# Patient Record
Sex: MALE | Race: ASIAN
[De-identification: names, ages, dates, MRNs, and addresses within clinical notes are randomized per-mention and may not be internally consistent; named-entity substitution may affect disease eponyms.]

---

## 2018-10-13 ENCOUNTER — HOSPITAL ENCOUNTER (INPATIENT)
Dept: HOSPITAL 74 - YASAS | Age: 63
Discharge: ANOTHER HOSPITAL | End: 2018-10-13
Attending: INTERNAL MEDICINE | Admitting: INTERNAL MEDICINE
Payer: COMMERCIAL

## 2018-10-13 ENCOUNTER — HOSPITAL ENCOUNTER (INPATIENT)
Dept: HOSPITAL 74 - JER | Age: 63
LOS: 6 days | Discharge: HOME | End: 2018-10-19
Attending: INTERNAL MEDICINE | Admitting: INTERNAL MEDICINE
Payer: COMMERCIAL

## 2018-10-13 VITALS — TEMPERATURE: 97.5 F | DIASTOLIC BLOOD PRESSURE: 118 MMHG | HEART RATE: 126 BPM | SYSTOLIC BLOOD PRESSURE: 156 MMHG

## 2018-10-13 VITALS — BODY MASS INDEX: 26.5 KG/M2

## 2018-10-13 VITALS — BODY MASS INDEX: 25.8 KG/M2

## 2018-10-13 DIAGNOSIS — Z96.653: ICD-10-CM

## 2018-10-13 DIAGNOSIS — R74.0: ICD-10-CM

## 2018-10-13 DIAGNOSIS — M10.9: ICD-10-CM

## 2018-10-13 DIAGNOSIS — R73.9: ICD-10-CM

## 2018-10-13 DIAGNOSIS — F10.230: Primary | ICD-10-CM

## 2018-10-13 DIAGNOSIS — D64.9: ICD-10-CM

## 2018-10-13 DIAGNOSIS — E83.42: ICD-10-CM

## 2018-10-13 DIAGNOSIS — I10: ICD-10-CM

## 2018-10-13 DIAGNOSIS — L81.4: ICD-10-CM

## 2018-10-13 DIAGNOSIS — M06.9: ICD-10-CM

## 2018-10-13 DIAGNOSIS — Z91.14: ICD-10-CM

## 2018-10-13 DIAGNOSIS — L97.829: ICD-10-CM

## 2018-10-13 DIAGNOSIS — F13.10: ICD-10-CM

## 2018-10-13 DIAGNOSIS — R60.0: ICD-10-CM

## 2018-10-13 LAB
ALBUMIN SERPL-MCNC: 2.7 G/DL (ref 3.4–5)
ALP SERPL-CCNC: 138 U/L (ref 45–117)
ALT SERPL-CCNC: 45 U/L (ref 13–61)
AMYLASE SERPL-CCNC: 67 U/L (ref 25–115)
ANION GAP SERPL CALC-SCNC: 9 MMOL/L (ref 8–16)
APPEARANCE UR: CLEAR
AST SERPL-CCNC: 70 U/L (ref 15–37)
BASOPHILS # BLD: 1.6 % (ref 0–2)
BILIRUB SERPL-MCNC: 0.6 MG/DL (ref 0.2–1)
BILIRUB UR STRIP.AUTO-MCNC: NEGATIVE MG/DL
BUN SERPL-MCNC: 12 MG/DL (ref 7–18)
CALCIUM SERPL-MCNC: 7.5 MG/DL (ref 8.5–10.1)
CHLORIDE SERPL-SCNC: 104 MMOL/L (ref 98–107)
CO2 SERPL-SCNC: 26 MMOL/L (ref 21–32)
COLOR UR: (no result)
CREAT SERPL-MCNC: 1 MG/DL (ref 0.55–1.3)
DEPRECATED RDW RBC AUTO: 17.7 % (ref 11.9–15.9)
EOSINOPHIL # BLD: 2.1 % (ref 0–4.5)
EPITH CASTS URNS QL MICRO: (no result) /HPF
GLUCOSE SERPL-MCNC: 96 MG/DL (ref 74–106)
HCT VFR BLD CALC: 37.2 % (ref 35.4–49)
HGB BLD-MCNC: 12.3 GM/DL (ref 11.7–16.9)
INR BLD: 1.02 (ref 0.83–1.09)
KETONES UR QL STRIP: NEGATIVE
LEUKOCYTE ESTERASE UR QL STRIP.AUTO: NEGATIVE
LIPASE SERPL-CCNC: 192 U/L (ref 73–393)
LYMPHOCYTES # BLD: 11.8 % (ref 8–40)
MAGNESIUM SERPL-MCNC: 1.6 MG/DL (ref 1.8–2.4)
MCH RBC QN AUTO: 30.1 PG (ref 25.7–33.7)
MCHC RBC AUTO-ENTMCNC: 33.1 G/DL (ref 32–35.9)
MCV RBC: 91 FL (ref 80–96)
MONOCYTES # BLD AUTO: 6.1 % (ref 3.8–10.2)
MUCOUS THREADS URNS QL MICRO: (no result)
NEUTROPHILS # BLD: 78.4 % (ref 42.8–82.8)
NITRITE UR QL STRIP: NEGATIVE
PH UR: 7 [PH] (ref 5–8)
PLATELET # BLD AUTO: 438 K/MM3 (ref 134–434)
PMV BLD: 7.7 FL (ref 7.5–11.1)
POTASSIUM SERPLBLD-SCNC: 3.5 MMOL/L (ref 3.5–5.1)
PROT SERPL-MCNC: 6.7 G/DL (ref 6.4–8.2)
PROT UR QL STRIP: NEGATIVE
PROT UR QL STRIP: NEGATIVE
PT PNL PPP: 12 SEC (ref 9.7–13)
RBC # BLD AUTO: 4.09 M/MM3 (ref 4–5.6)
SODIUM SERPL-SCNC: 139 MMOL/L (ref 136–145)
SP GR UR: 1 (ref 1.01–1.03)
UROBILINOGEN UR STRIP-MCNC: NEGATIVE MG/DL (ref 0.2–1)
WBC # BLD AUTO: 7.3 K/MM3 (ref 4–10)

## 2018-10-13 PROCEDURE — G0008 ADMIN INFLUENZA VIRUS VAC: HCPCS

## 2018-10-13 PROCEDURE — HZ2ZZZZ DETOXIFICATION SERVICES FOR SUBSTANCE ABUSE TREATMENT: ICD-10-PCS | Performed by: INTERNAL MEDICINE

## 2018-10-13 NOTE — PN
Teaching Attending Note


Name of Resident: Naida Finnegan





ATTENDING PHYSICIAN STATEMENT





I saw and evaluated the patient.


I reviewed the resident's note and discussed the case with the resident.


I agree with the resident's findings and plan as documented with exceptions 

below.








SUBJECTIVE:


63 yom with PMhx of ETOH abuse, HTN (non compliant with meds), drinks heavily 

about 1.5 bottles of vodka/day, 





OBJECTIVE:








ASSESSMENT AND PLAN:

## 2018-10-13 NOTE — PDOC
History of Present Illness





- General


Chief Complaint: Tachycardia


Stated Complaint: TACHYCARDIA


Time Seen by Provider: 10/13/18 13:02


History Source: Patient


Exam Limitations: No Limitations





- History of Present Illness


Initial Comments: 





10/13/18 13:17


This is a 63 YOM with h/o heavy EtOH use (drinks a fifth to a fifth-and-a-half 

of liquor/day, prior withdrawal and detox 2 years ago c/b withdrawal seizures) 

and HTN who p/w shakes, head-to-toe body aches, generalized weakness, malaise, 

RHR, and high blood pressure in the setting of stopping drinking EtOH 

yesterday. He notes the symptoms are worsening and are similar to his prior 

withdrawal symptoms. He denies any difficulty balancing or hallucinations 

today. He notes the generalized body aches are worse in both calves than 

anywhere else (but the calves are equally painful both sides). He denies SI/HI, 

chest pain, headache, vertigo, vision change, abdominal pain, dysuria, or 

additional symptoms. He states he has not gotten any type of medication for 

withdrawal or HTN or other problems.





Past History





- Past Medical History


Allergies/Adverse Reactions: 


 Allergies











Allergy/AdvReac Type Severity Reaction Status Date / Time


 


No Known Allergies Allergy   Verified 10/13/18 10:00











Home Medications: 


Ambulatory Orders





NK [No Known Home Medication]  10/13/18 








Asthma: No


Cardiac Disorders: No


COPD: No


Diabetes: No


GI Disorders: No


 Disorders: No


HTN: Yes


Kidney Stones: No


Seizures: No





- Surgical History


Abdominal Surgery: No


Appendectomy: No


Cardiac Surgery: No


Cholecystectomy: No


Lung Surgery: No


Neurologic Surgery: No


Orthopedic Surgery: Yes (B/L REPLACEMENT 2017)





- Reproductive History


Testicular Surgery: No





- Suicide/Smoking/Psychosocial Hx


Smoking History: Never smoked


Have you smoked in the past 12 months: No


Hx Substance Use Treatment: Yes ([facility] 2011)





**Review of Systems





- Review of Systems


Able to Perform ROS?: Yes


Constitutional: Yes: Malaise, Weakness.  No: Chills, Fever, Unexplained wgt Loss


HEENTM: No: Nose Congestion, Throat Pain


Respiratory: No: Cough, Shortness of Breath


Cardiac (ROS): Yes: Edema, Palpitations.  No: Chest Pain, Lightheadedness, 

Syncope


ABD/GI: No: Constipated, Diarrhea, Nausea, Vomiting


: No: Burning, Dysuria


Musculoskeletal: No: Back Pain, Neck Pain


Integumentary: No: Bruising, Rash


Neurological: Yes: Tremors.  No: Headache, Numbness, Tingling, Weakness, 

Dizziness


Endocrine: No: Unexplained Weight Gain, Unexplained Weight Loss





*Physical Exam





- Vital Signs


 Last Vital Signs











Temp Pulse Resp BP Pulse Ox


 


 98.9 F   134 H  18   159/119 H  98 


 


 10/13/18 12:56  10/13/18 12:56  10/13/18 12:56  10/13/18 12:56  10/13/18 12:56





Rectal temp 99.2





10/13/18 13:25


GENERAL: appears a bit uncomfortable, tremulous, nourished, A/Ox4, speaking in 

full sentences, answers questions appropriately, slightly slurred speech


HEENT: PERRLA, EOMI, dry mucous membranes, no posterior pharyngeal erythema, no 

tonsillar swelling or exudates, no cervical lymphadenopathy


NECK: No midline ttp, no spinal stepoff or deformity, full ROM, supple


CARDIOVASCULAR: Rapid regular rate, normal S1S2, no MGR, radial and DP pulses 2

+ and symmetric, capillary refill <2 seconds, extremities warm and well-perfused

, trace pitting edema BLE symmetrically


LUNGS/RESPIRATORY: No respiratory distress, normal and symmetric chest 

movements during respirations, lungs CTA bilaterally, equal breath sounds, no 

cyanosis, no nail clubbing


GI/ABDOMEN: Normal symmetric appearance, normoactive bowel sounds, soft, no 

tenderness to palpation, no midline pulsatile masses, no palpated organomegaly


BACK: No midline ttp or stepoff or deformity of thoracic or lumbar spine


EXTREMITIES: distal pulses 2+, warm and well-perfused, trace BLE edema 

symmetrically with symmetric mild calf TTP


SKIN: Warm and dry, lower legs overly dry and scaly with a few avulsed scales 

and exposed dermis, no pallor, no jaundice, no bruising


NEUROLOGICAL: GCS 15, CN II-XII grossly intact, gait not tested, moving all 

extremities, 5/5 strength proximally and distally, no facial droop, no 

decreased sensation, +tremulous








**Heart Score/ECG Review


  ** #1





10/13/18 13:19


Sinus rhythm, rate of 132, normal axis and intervals, no ischemic ST-T changes





  ** #2





10/13/18 14:46


Sinus rhythm, rate of 127, normal axis, normal QRS, QRS is prolonged grossly 

and calculated by computer at 546, no TONIA





ED Treatment Course





- LABORATORY


CBC & Chemistry Diagram: 


 10/13/18 13:10





 10/13/18 16:34





Medical Decision Making





- Medical Decision Making





10/13/18 13:12


Pt with h/o heavy alcohol use p/w 





 Initial Vital Signs











Temp Pulse Resp BP Pulse Ox


 


 98.9 F   134 H  18   159/119 H  98 


 


 10/13/18 12:56  10/13/18 12:56  10/13/18 12:56  10/13/18 12:56  10/13/18 12:56











Exam: As noted in Physical Exam section.


DDX IBNLT: intoxication, withdrawal (w/wo seizures), DT, hepatic encephalopathy

, alcoholic cardiomyopathy, ICH, UGIB (can cause AMS), LGIB, SBP, metabolic 

derangement, coingestion, hepatorenal syndrome, hepatopulmonary syndrome, 

Wernickes encephalopathy, Korsakoff syndrome, trauma, etc.


W/U ordered: EKG CXR BLE Duplex Labs as noted below


TX ordered: Banana Bag IVF Librium 50 mg Ativan 1 mg IVPUSH





10/13/18 13:49


Placed US guided PIV LAC, 20g.





EKG: Reviewed; results as noted in ECG Review section.


CXR: No acute changes; large heart, unfolded aorta.








 Vital Signs











Temperature  98.9 F   10/13/18 12:56


 


Pulse Rate  124 H  10/13/18 14:41


 


Respiratory Rate  18   10/13/18 14:41


 


Blood Pressure  107/83   10/13/18 14:32


 


O2 Sat by Pulse Oximetry (%)  96   10/13/18 14:41








10/13/18 14:43


Patient now persistently in the 180s HR.


Repeating EKG now.





10/13/18 14:47


Rhythm strip printed from telemetry tracing at this time.


At 14:47 irregular sinus rhythm recorded with rate averaging 150.


At 14:48 patient's rhythm turns regular, rate about 180.


Difficult to tell but this does still appear sinus rhythm, p buried in QRS.


We are diligently watching for e/o SVT which we do not see at this time.





 Laboratory Tests











  10/13/18 10/13/18 10/13/18





  13:10 13:10 13:17


 


WBC  7.3  


 


RBC  4.09  


 


Hgb  12.3  


 


Hct  37.2  


 


MCV  91.0  


 


MCH  30.1  


 


MCHC  33.1  


 


RDW  17.7 H  


 


Plt Count  438 H  


 


MPV  7.7  


 


Absolute Neuts (auto)  5.7  


 


Neutrophils %  78.4  


 


Lymphocytes %  11.8  


 


Monocytes %  6.1  


 


Eosinophils %  2.1  


 


Basophils %  1.6  


 


Nucleated RBC %  0  


 


PT with INR   12.00 


 


INR   1.02 


 


Sodium    Cancelled


 


Potassium    Cancelled


 


Chloride    Cancelled


 


Carbon Dioxide    Cancelled


 


Anion Gap    Cancelled


 


BUN    Cancelled


 


Creatinine    Cancelled


 


Creat Clearance w eGFR    Cancelled


 


Random Glucose    Cancelled


 


Calcium    Cancelled


 


Phosphorus    Cancelled


 


Magnesium    Cancelled


 


Total Bilirubin    Cancelled


 


AST    Cancelled


 


ALT    Cancelled


 


Alkaline Phosphatase    Cancelled


 


Creatine Kinase    Cancelled


 


Troponin I    Cancelled


 


B-Natriuretic Peptide    Cancelled


 


Total Protein    Cancelled


 


Albumin    Cancelled


 


Total Amylase   


 


Lipase    Cancelled


 


Urine Color   


 


Urine Appearance   


 


Urine pH   


 


Ur Specific Gravity   


 


Urine Protein   


 


Urine Glucose (UA)   


 


Urine Ketones   


 


Urine Blood   


 


Urine Nitrite   


 


Urine Bilirubin   


 


Urine Urobilinogen   


 


Ur Leukocyte Esterase   


 


Urine WBC (Auto)   


 


Urine RBC (Auto)   


 


Ur Epithelial Cells   


 


Urine Mucus   








10/13/18 14:27


I have ordered 2 mg IV Push as patient's HR remains high, he is still tremulous

, +tongue fasiculations.











  10/13/18 10/13/18 10/13/18





  15:30 15:30 15:30


 


WBC   


 


RBC   


 


Hgb   


 


Hct   


 


MCV   


 


MCH   


 


MCHC   


 


RDW   


 


Plt Count   


 


MPV   


 


Absolute Neuts (auto)   


 


Neutrophils %   


 


Lymphocytes %   


 


Monocytes %   


 


Eosinophils %   


 


Basophils %   


 


Nucleated RBC %   


 


PT with INR   


 


INR   


 


Sodium   Cancelled 


 


Potassium   Cancelled 


 


Chloride   Cancelled 


 


Carbon Dioxide   Cancelled 


 


Anion Gap   Cancelled 


 


BUN   Cancelled 


 


Creatinine   Cancelled 


 


Creat Clearance w eGFR   Cancelled 


 


Random Glucose   Cancelled 


 


Calcium   Cancelled 


 


Phosphorus   Cancelled 


 


Magnesium   Cancelled 


 


Total Bilirubin   Cancelled 


 


AST   Cancelled 


 


ALT   Cancelled 


 


Alkaline Phosphatase   Cancelled 


 


Creatine Kinase    Cancelled


 


Troponin I    Cancelled


 


B-Natriuretic Peptide    Cancelled


 


Total Protein   Cancelled 


 


Albumin   Cancelled 


 


Total Amylase   


 


Lipase    Cancelled


 


Urine Color  Straw  


 


Urine Appearance  Clear  


 


Urine pH  7.0  


 


Ur Specific Gravity  1.005 L  


 


Urine Protein  Negative  


 


Urine Glucose (UA)  Negative  


 


Urine Ketones  Negative  


 


Urine Blood  1+ H  


 


Urine Nitrite  Negative  


 


Urine Bilirubin  Negative  


 


Urine Urobilinogen  Negative  


 


Ur Leukocyte Esterase  Negative  


 


Urine WBC (Auto)  None  


 


Urine RBC (Auto)  1  


 


Ur Epithelial Cells  Rare  


 


Urine Mucus  Rare  














  10/13/18 10/13/18





  16:34 16:34


 


WBC  


 


RBC  


 


Hgb  


 


Hct  


 


MCV  


 


MCH  


 


MCHC  


 


RDW  


 


Plt Count  


 


MPV  


 


Absolute Neuts (auto)  


 


Neutrophils %  


 


Lymphocytes %  


 


Monocytes %  


 


Eosinophils %  


 


Basophils %  


 


Nucleated RBC %  


 


PT with INR  


 


INR  


 


Sodium  139 


 


Potassium  3.5 


 


Chloride  104 


 


Carbon Dioxide  26 


 


Anion Gap  9 


 


BUN  12 


 


Creatinine  1.0 


 


Creat Clearance w eGFR  > 60 


 


Random Glucose  96 


 


Calcium  7.5 L 


 


Phosphorus  


 


Magnesium  1.6 L 


 


Total Bilirubin  0.6 


 


AST  70 H 


 


ALT  45 


 


Alkaline Phosphatase  138 H 


 


Creatine Kinase   97


 


Troponin I   < 0.02


 


B-Natriuretic Peptide  


 


Total Protein  6.7 


 


Albumin  2.7 L 


 


Total Amylase  67 


 


Lipase  192 


 


Urine Color  


 


Urine Appearance  


 


Urine pH  


 


Ur Specific Gravity  


 


Urine Protein  


 


Urine Glucose (UA)  


 


Urine Ketones  


 


Urine Blood  


 


Urine Nitrite  


 


Urine Bilirubin  


 


Urine Urobilinogen  


 


Ur Leukocyte Esterase  


 


Urine WBC (Auto)  


 


Urine RBC (Auto)  


 


Ur Epithelial Cells  


 


Urine Mucus  








On preliminary read no e/o DVT.





Reassessment: Patient appears comfortable, , BP in the 120s systolic.


He has no complaints and is sleeping, easily arousable, still tremulous after a 

total of 3 mg Ativan.





10/13/18 18:04


The Pt is unsafe for discharge at this time.


They require further hospital observation, workup, and treatment.


Microblog sent to [facility] for admission.


Spoke with Dr. Lloyd, in agreement Pt to be admitted to [facility].


Decision to Admit order placed to Dr. Lloyd.


Placed order for another 1 mg Ativan IV push.





 Vital Signs











Temperature  98.9 F   10/13/18 12:56


 


Pulse Rate  92 H  10/13/18 22:35


 


Respiratory Rate  18   10/13/18 22:35


 


Blood Pressure  135/101 H  10/13/18 22:35


 


O2 Sat by Pulse Oximetry (%)  100   10/13/18 22:35











*DC/Admit/Observation/Transfer


Diagnosis at time of Disposition: 


Alcohol withdrawal


Qualifiers:


 Complication of substance-induced condition: uncomplicated Qualified Code(s): 

F10.230 - Alcohol dependence with withdrawal, uncomplicated








- Discharge Dispostion


Condition at time of disposition: Guarded


Decision to Admit order: Yes





- Referrals





- Patient Instructions





- Post Discharge Activity

## 2018-10-13 NOTE — HP
CHIEF COMPLAINT:shaking, palpitations





PCP:





HISTORY OF PRESENT ILLNESS:


Patient is a 63 year old male with past medical history of EtOH abuse (

recurrent admissions at [facility] for withdrawal and detox, hx of withdrawal 

seizures), HTN and gout, was brought in from [facility] due to shaking, 

generalized body aches and weakness, and heart racing for a few days. Patient's 

last drink was last night where he consumed 1 and a half bottles of vodka and 

passed out. As per patient, he was brought to [facility], where a head CT was 

done, reportedly to be negative, and he was given Librium. He was then 

discharged to [facility] for further rehab and detox today. At [facility], 

patient reported to have worsening of shaking, body aches, weakness and 

palpitations, and was brought here. Patient denies nausea, vomiting, headaches, 

chest pain, SOB, abdominal pain, diarrhea, constipation, or urinary symptoms. 





ER course was notable for:


(1)Ativan 4mg given, Librium 50mg PO


(2)EKG - sinus tachycardia


(3)Banana bag, Mg sulfate 1gm IV 





Recent Travel: denies any recent travel





PAST MEDICAL HISTORY:


Ethanol abuse


HTN


Gout





PAST SURGICAL HISTORY:


B/l knee replacement 


?Spinal surgery





Social History:


Smoking:denies smoking


Alcohol:drinks 1 to 1 1/2 bottles of vodka almost everyday


Drugs: denies


lives with sister, works as a 





Family History:


Allergies





No Known Allergies Allergy (Verified 10/13/18 10:00)


 








HOME MEDICATIONS:


 Home Medications











 Medication  Instructions  Recorded


 


NK [No Known Home Medication]  10/13/18








REVIEW OF SYSTEMS


CONSTITUTIONAL: shaking


Absent:  fever, chills, diaphoresis, generalized weakness, malaise, loss of 

appetite, weight change


HEENT: 


Absent:  rhinorrhea, nasal congestion, throat pain, throat swelling, difficulty 

swallowing, mouth swelling, ear pain, eye pain, visual changes


CARDIOVASCULAR: palpitations


Absent: chest pain, syncope,irregular heart rate, lightheadedness, peripheral 

edema


RESPIRATORY: 


Absent: cough, shortness of breath, dyspnea with exertion, orthopnea, wheezing, 

stridor, hemoptysis


GASTROINTESTINAL:


Absent: abdominal pain, abdominal distension, nausea, vomiting, diarrhea, 

constipation, melena, hematochezia


GENITOURINARY: 


Absent: dysuria, frequency, urgency, hesitancy, hematuria, flank pain, genital 

pain


MUSCULOSKELETAL: 


Absent: myalgia, arthralgia, joint swelling, back pain, neck pain


SKIN: 


Absent: rash, itching, pallor


HEMATOLOGIC/IMMUNOLOGIC: 


Absent: easy bleeding, easy bruising, lymphadenopathy, frequent infections


ENDOCRINE:


Absent: unexplained weight gain, unexplained weight loss, heat intolerance, 

cold intolerance


NEUROLOGIC: 


Absent: headache, focal weakness or paresthesias, dizziness, unsteady gait, 

seizure, mental status changes, bladder or bowel incontinence


PSYCHIATRIC: 


Absent: anxiety, depression, suicidal or homicidal ideation, hallucinations.








PHYSICAL EXAMINATION


 Vital Signs - 24 hr











  10/13/18 10/13/18 10/13/18





  12:56 14:32 14:41


 


Temperature 98.9 F  


 


Pulse Rate 134 H  


 


Pulse Rate [   124 H





Apical]   


 


Respiratory 18  18





Rate   


 


Blood Pressure 159/119 H  


 


Blood Pressure  107/83 





[Left Arm]   


 


O2 Sat by Pulse 98  96





Oximetry (%)   














  10/13/18





  19:20


 


Temperature 


 


Pulse Rate 


 


Pulse Rate [ 114 H





Apical] 


 


Respiratory 18





Rate 


 


Blood Pressure 


 


Blood Pressure 





[Left Arm] 


 


O2 Sat by Pulse 96





Oximetry (%) 











GENERAL: Awake, alert, and fully oriented, in no acute distress.


HEAD: patchy hypopigmented areas


EYES: PERRLA, EOMI, sclera anicteric, conjunctiva clear. No lid lag.


EARS, NOSE, THROAT: Ears normal, nares patent, oropharynx clear without 

exudates. Dry mucous membranes.


NECK: Normal range of motion, supple without lymphadenopathy, JVD, or masses.


LUNGS: Breath sounds equal, clear to auscultation bilaterally. 


HEART: Tachycardic, normal S1 and S2 without murmur, rub or gallop.


ABDOMEN: Soft, nontender, not distended, normoactive bowel sounds.


MUSCULOSKELETAL: Normal range of motion at all joints. No bony deformities or 

tenderness. No CVA tenderness.


UPPER EXTREMITIES: 2+ pulses, warm, well-perfused. No cyanosis. No clubbing. No 

peripheral edema.


LOWER EXTREMITIES: 2+ pulses, warm, well-perfused. No calf tenderness. +1 b/l 

pitting edema.  +dry, flaky skin on both legs, with wounds and scabs 


NEUROLOGICAL:  Cranial nerves II-XII intact. Normal speech. Gait not observed. 

Sensation intact, motor 5/5


PSYCHIATRIC: Cooperative. Good eye contact. Appropriate mood and affect.


SKIN: Warm, dry, normal turgor, no rashes or lesions noted, normal capillary 

refill. 


 Laboratory Results - last 24 hr











  10/13/18 10/13/18 10/13/18





  13:10 13:10 13:17


 


WBC  7.3  


 


RBC  4.09  


 


Hgb  12.3  


 


Hct  37.2  


 


MCV  91.0  


 


MCH  30.1  


 


MCHC  33.1  


 


RDW  17.7 H  


 


Plt Count  438 H  


 


MPV  7.7  


 


Absolute Neuts (auto)  5.7  


 


Neutrophils %  78.4  


 


Lymphocytes %  11.8  


 


Monocytes %  6.1  


 


Eosinophils %  2.1  


 


Basophils %  1.6  


 


Nucleated RBC %  0  


 


PT with INR   12.00 


 


INR   1.02 


 


Sodium    Cancelled


 


Potassium    Cancelled


 


Chloride    Cancelled


 


Carbon Dioxide    Cancelled


 


Anion Gap    Cancelled


 


BUN    Cancelled


 


Creatinine    Cancelled


 


Creat Clearance w eGFR    Cancelled


 


Random Glucose    Cancelled


 


Calcium    Cancelled


 


Phosphorus    Cancelled


 


Magnesium    Cancelled


 


Total Bilirubin    Cancelled


 


AST    Cancelled


 


ALT    Cancelled


 


Alkaline Phosphatase    Cancelled


 


Creatine Kinase    Cancelled


 


Troponin I    Cancelled


 


B-Natriuretic Peptide    Cancelled


 


Total Protein    Cancelled


 


Albumin    Cancelled


 


Total Amylase   


 


Lipase    Cancelled


 


Urine Color   


 


Urine Appearance   


 


Urine pH   


 


Ur Specific Gravity   


 


Urine Protein   


 


Urine Glucose (UA)   


 


Urine Ketones   


 


Urine Blood   


 


Urine Nitrite   


 


Urine Bilirubin   


 


Urine Urobilinogen   


 


Ur Leukocyte Esterase   


 


Urine WBC (Auto)   


 


Urine RBC (Auto)   


 


Ur Epithelial Cells   


 


Urine Mucus   














  10/13/18 10/13/18 10/13/18





  15:30 15:30 15:30


 


WBC   


 


RBC   


 


Hgb   


 


Hct   


 


MCV   


 


MCH   


 


MCHC   


 


RDW   


 


Plt Count   


 


MPV   


 


Absolute Neuts (auto)   


 


Neutrophils %   


 


Lymphocytes %   


 


Monocytes %   


 


Eosinophils %   


 


Basophils %   


 


Nucleated RBC %   


 


PT with INR   


 


INR   


 


Sodium   Cancelled 


 


Potassium   Cancelled 


 


Chloride   Cancelled 


 


Carbon Dioxide   Cancelled 


 


Anion Gap   Cancelled 


 


BUN   Cancelled 


 


Creatinine   Cancelled 


 


Creat Clearance w eGFR   Cancelled 


 


Random Glucose   Cancelled 


 


Calcium   Cancelled 


 


Phosphorus   Cancelled 


 


Magnesium   Cancelled 


 


Total Bilirubin   Cancelled 


 


AST   Cancelled 


 


ALT   Cancelled 


 


Alkaline Phosphatase   Cancelled 


 


Creatine Kinase    Cancelled


 


Troponin I    Cancelled


 


B-Natriuretic Peptide    Cancelled


 


Total Protein   Cancelled 


 


Albumin   Cancelled 


 


Total Amylase   


 


Lipase    Cancelled


 


Urine Color  Straw  


 


Urine Appearance  Clear  


 


Urine pH  7.0  


 


Ur Specific Gravity  1.005 L  


 


Urine Protein  Negative  


 


Urine Glucose (UA)  Negative  


 


Urine Ketones  Negative  


 


Urine Blood  1+ H  


 


Urine Nitrite  Negative  


 


Urine Bilirubin  Negative  


 


Urine Urobilinogen  Negative  


 


Ur Leukocyte Esterase  Negative  


 


Urine WBC (Auto)  None  


 


Urine RBC (Auto)  1  


 


Ur Epithelial Cells  Rare  


 


Urine Mucus  Rare  














  10/13/18 10/13/18





  16:34 16:34


 


WBC  


 


RBC  


 


Hgb  


 


Hct  


 


MCV  


 


MCH  


 


MCHC  


 


RDW  


 


Plt Count  


 


MPV  


 


Absolute Neuts (auto)  


 


Neutrophils %  


 


Lymphocytes %  


 


Monocytes %  


 


Eosinophils %  


 


Basophils %  


 


Nucleated RBC %  


 


PT with INR  


 


INR  


 


Sodium  139 


 


Potassium  3.5 


 


Chloride  104 


 


Carbon Dioxide  26 


 


Anion Gap  9 


 


BUN  12 


 


Creatinine  1.0 


 


Creat Clearance w eGFR  > 60 


 


Random Glucose  96 


 


Calcium  7.5 L 


 


Phosphorus  


 


Magnesium  1.6 L 


 


Total Bilirubin  0.6 


 


AST  70 H 


 


ALT  45 


 


Alkaline Phosphatase  138 H 


 


Creatine Kinase   97


 


Troponin I   < 0.02


 


B-Natriuretic Peptide  


 


Total Protein  6.7 


 


Albumin  2.7 L 


 


Total Amylase  67 


 


Lipase  192 


 


Urine Color  


 


Urine Appearance  


 


Urine pH  


 


Ur Specific Gravity  


 


Urine Protein  


 


Urine Glucose (UA)  


 


Urine Ketones  


 


Urine Blood  


 


Urine Nitrite  


 


Urine Bilirubin  


 


Urine Urobilinogen  


 


Ur Leukocyte Esterase  


 


Urine WBC (Auto)  


 


Urine RBC (Auto)  


 


Ur Epithelial Cells  


 


Urine Mucus  











ASSESSMENT/PLAN:


Patient is a 63 year old male with past medical history of EtOH abuse (

recurrent admissions at [facility] for withdrawal and detox, hx of withdrawal 

seizures), HTN and gout, was brought in from [facility] due to shaking, 

generalized body aches and weakness, and heart racing for a few days. 





#Alcohol withdrawal


-Previous history of withdrawal seizures


-Close monitoring and seizure precautions


-Ativan 2mg IV q2h PRN


-Replete Mg, Folic acid


-Thiamine 1000mg IVPB q7eralq





#Hypomagnesemia


-Mg 1.6


-replete as necessary


-Magnesium 1gm x3 given overnight


-MgOx 800mg PO daily


-will monitor





#Hypertension


-Pt not compliant, don't know what medications his taking


-will reconcile medications in the AM


-monitor BP





#FEN


-IV D5NS + 20meqKCl @100ml/hr


-hypoMg, will replete


-Routine bmp monitoring


-Sodium controlled diet with aspiration precautions





#Prophylaxis


-Heparin 5000units sq tid





#Disposition


-admit to telemetry


-Discharge back to [facility] when medically optimized.





Visit type





- Emergency Visit


Emergency Visit: Yes


ED Registration Date: 10/13/18


Care time: The patient presented to the Emergency Department on the above date 

and was hospitalized for further evaluation of their emergent condition.





- New Patient


This patient is new to me today: Yes


Date on this admission: 10/14/18





- Critical Care


Critical Care patient: No

## 2018-10-13 NOTE — PN
Teaching Attending Note


Name of Resident: Naida Finnegan





ATTENDING PHYSICIAN STATEMENT





I saw and evaluated the patient.


I reviewed the resident's note and discussed the case with the resident.


I agree with the resident's findings and plan as documented with exceptions 

below.








SUBJECTIVE:


63 yom with PMHx of ETOH abuse, drinks 1.5 Bottles/day, HTN non compliant with 

meds, prior admissions for ETOH withdrawal (reportedly at [facility] for a few 

weeks for alcohol withdrawal a month ago, when was on 'drips), possible ETOH 

withdrawal seizures, yesterday had 1.5 Bottles of vodka, after which lost 

conscious and fell, was taken to [facility], after reportedly neg w/u 

including CT head, sent to [facility], was noted tachycardic/hypertensive, sent 

to ED.


patient reported palpitations, tremors, was noted HR 170s in the ED (?SVT) and 

hypertensive that improved with IV ativan.


Currently comfortably, falling back to sleep, reports some palpitations, but 

denies any chest pain, dyspnea, dizziness, nausea, vomiting, abdominal pain, 

headache, chest pressure or new concerns.


12 point ROS done, limited but given above. 





OBJECTIVE:


 Vital Signs











 Period  Temp  Pulse  Resp  BP Sys/Cheung  Pulse Ox


 


 Last 24 Hr  98.9 F  124-134  18-18  107-159/  96-98








 Intake & Output











 10/10/18 10/11/18 10/12/18 10/13/18





 23:59 23:59 23:59 23:59


 


Weight    163 lb











GENERAL: Sleeping but arousable, appropriate in conversation when woken up


HEAD: multiple prior old scars and hyperpigmentation


EYES: Pupils equal, round and reactive to light, extraocular movements intact, 

sclera anicteric, conjunctiva clear. No lid lag.


EARS, NOSE, THROAT: poor dentition, dry mukcous membrane


NECK: soft, supple, no JVD


LUNGS: Breath sounds equal, clear to auscultation bilaterally. No wheezes, and 

no crackles. No accessory muscle use.


HEART: S1s2 regular tachycardic.


ABDOMEN: Soft, nontender, not distended, normoactive bowel sounds, no guarding, 

no rebound, no masses.  


MUSCULOSKELETAL: Normal range of motion at all joints. No bony deformities or 

tenderness. No CVA tenderness.


UPPER EXTREMITIES: 2+ pulses, warm, well-perfused. No cyanosis. No clubbing. No 

peripheral edema.


LOWER EXTREMITIES: bilateral lower extremity skin pigmentation, flaking, 

multiple superficial ulceration and scabs, positive DP pulses


NEUROLOGICAL:  Arousable, but falls back to sleep, moves all extremities freely

, mild tremors, no focal deficit but limited exam as patient keeps falling back 

to sleep


PSYCHIATRIC: co-operative but keeps falling back to sleep


SKIN: Warm, dry, normal turgor, no rashes or lesions noted, normal capillary 

refill. 





 Home Medications











 Medication  Instructions  Recorded


 


NK [No Known Home Medication]  10/13/18














Active Medications





Heparin Sodium (Porcine) (Heparin -)  5,000 unit SQ ONCE ONE


   Stop: 10/13/18 19:01


Folic Acid 1 mg/ Thiamine HCl 100 mg/ Multivitamins/Minerals 10 ml/ Sodium 

Chloride  1,000 mls @ 125 mls/hr IVPB ONCE ONE


   Stop: 10/13/18 21:49


   Last Admin: 10/13/18 15:01 Dose:  125 mls/hr


Lorazepam (Ativan Injection -)  1 mg IVPUSH Q2H PRN


   PRN Reason: WITHDRAWAL(CONT SUBST)





 Laboratory Results - last 24 hr











  10/13/18 10/13/18 10/13/18





  13:10 13:10 13:17


 


WBC  7.3  


 


RBC  4.09  


 


Hgb  12.3  


 


Hct  37.2  


 


MCV  91.0  


 


MCH  30.1  


 


MCHC  33.1  


 


RDW  17.7 H  


 


Plt Count  438 H  


 


MPV  7.7  


 


Absolute Neuts (auto)  5.7  


 


Neutrophils %  78.4  


 


Lymphocytes %  11.8  


 


Monocytes %  6.1  


 


Eosinophils %  2.1  


 


Basophils %  1.6  


 


Nucleated RBC %  0  


 


PT with INR   12.00 


 


INR   1.02 


 


Sodium    Cancelled


 


Potassium    Cancelled


 


Chloride    Cancelled


 


Carbon Dioxide    Cancelled


 


Anion Gap    Cancelled


 


BUN    Cancelled


 


Creatinine    Cancelled


 


Creat Clearance w eGFR    Cancelled


 


Random Glucose    Cancelled


 


Calcium    Cancelled


 


Phosphorus    Cancelled


 


Magnesium    Cancelled


 


Total Bilirubin    Cancelled


 


AST    Cancelled


 


ALT    Cancelled


 


Alkaline Phosphatase    Cancelled


 


Creatine Kinase    Cancelled


 


Troponin I    Cancelled


 


B-Natriuretic Peptide    Cancelled


 


Total Protein    Cancelled


 


Albumin    Cancelled


 


Total Amylase   


 


Lipase    Cancelled


 


Urine Color   


 


Urine Appearance   


 


Urine pH   


 


Ur Specific Gravity   


 


Urine Protein   


 


Urine Glucose (UA)   


 


Urine Ketones   


 


Urine Blood   


 


Urine Nitrite   


 


Urine Bilirubin   


 


Urine Urobilinogen   


 


Ur Leukocyte Esterase   


 


Urine WBC (Auto)   


 


Urine RBC (Auto)   


 


Ur Epithelial Cells   


 


Urine Mucus   














  10/13/18 10/13/18 10/13/18





  15:30 15:30 15:30


 


WBC   


 


RBC   


 


Hgb   


 


Hct   


 


MCV   


 


MCH   


 


MCHC   


 


RDW   


 


Plt Count   


 


MPV   


 


Absolute Neuts (auto)   


 


Neutrophils %   


 


Lymphocytes %   


 


Monocytes %   


 


Eosinophils %   


 


Basophils %   


 


Nucleated RBC %   


 


PT with INR   


 


INR   


 


Sodium   Cancelled 


 


Potassium   Cancelled 


 


Chloride   Cancelled 


 


Carbon Dioxide   Cancelled 


 


Anion Gap   Cancelled 


 


BUN   Cancelled 


 


Creatinine   Cancelled 


 


Creat Clearance w eGFR   Cancelled 


 


Random Glucose   Cancelled 


 


Calcium   Cancelled 


 


Phosphorus   Cancelled 


 


Magnesium   Cancelled 


 


Total Bilirubin   Cancelled 


 


AST   Cancelled 


 


ALT   Cancelled 


 


Alkaline Phosphatase   Cancelled 


 


Creatine Kinase    Cancelled


 


Troponin I    Cancelled


 


B-Natriuretic Peptide    Cancelled


 


Total Protein   Cancelled 


 


Albumin   Cancelled 


 


Total Amylase   


 


Lipase    Cancelled


 


Urine Color  Straw  


 


Urine Appearance  Clear  


 


Urine pH  7.0  


 


Ur Specific Gravity  1.005 L  


 


Urine Protein  Negative  


 


Urine Glucose (UA)  Negative  


 


Urine Ketones  Negative  


 


Urine Blood  1+ H  


 


Urine Nitrite  Negative  


 


Urine Bilirubin  Negative  


 


Urine Urobilinogen  Negative  


 


Ur Leukocyte Esterase  Negative  


 


Urine WBC (Auto)  None  


 


Urine RBC (Auto)  1  


 


Ur Epithelial Cells  Rare  


 


Urine Mucus  Rare  














  10/13/18 10/13/18





  16:34 16:34


 


WBC  


 


RBC  


 


Hgb  


 


Hct  


 


MCV  


 


MCH  


 


MCHC  


 


RDW  


 


Plt Count  


 


MPV  


 


Absolute Neuts (auto)  


 


Neutrophils %  


 


Lymphocytes %  


 


Monocytes %  


 


Eosinophils %  


 


Basophils %  


 


Nucleated RBC %  


 


PT with INR  


 


INR  


 


Sodium  139 


 


Potassium  3.5 


 


Chloride  104 


 


Carbon Dioxide  26 


 


Anion Gap  9 


 


BUN  12 


 


Creatinine  1.0 


 


Creat Clearance w eGFR  > 60 


 


Random Glucose  96 


 


Calcium  7.5 L 


 


Phosphorus  


 


Magnesium  1.6 L 


 


Total Bilirubin  0.6 


 


AST  70 H 


 


ALT  45 


 


Alkaline Phosphatase  138 H 


 


Creatine Kinase   97


 


Troponin I   < 0.02


 


B-Natriuretic Peptide  


 


Total Protein  6.7 


 


Albumin  2.7 L 


 


Total Amylase  67 


 


Lipase  192 


 


Urine Color  


 


Urine Appearance  


 


Urine pH  


 


Ur Specific Gravity  


 


Urine Protein  


 


Urine Glucose (UA)  


 


Urine Ketones  


 


Urine Blood  


 


Urine Nitrite  


 


Urine Bilirubin  


 


Urine Urobilinogen  


 


Ur Leukocyte Esterase  


 


Urine WBC (Auto)  


 


Urine RBC (Auto)  


 


Ur Epithelial Cells  


 


Urine Mucus  








LE duplex results pending


EKG Sinus tach 120s-130s


Rhythm strip





ASSESSMENT AND PLAN:


63 yomw ith heavy etoh use, HTN, admitted with active alcohol withdrawal, 

transaminitis, hypomagnesemia





-Active alcohol withdrawal


-Transaminitis, suspect alcohol related


-Hypomagnesemia


-Tachycardia, ?SVT (Few P waves on rhythm strip)


-LE chronic edema with ulceration/scabs





Plan:


ativan 1 mg IV q2h prn, seizure precautions.


Serial exams. High risk for DTs and withdrawal seizures given history, hold off.


HR/BP improved with ativan. 


Rate controlling agents based on clinical course.


Trend LFTs.


Replete mg


Folate/thiamine IV.


Alcohol cessation counseling.


Dispo back to [facility] when improved.


Plan discussed with patient in detail, all questions answered.


Admit to telemetry. 


Total admit time 65 min.

## 2018-10-13 NOTE — PDOC
Attending Attestation





- Resident


Resident Name: Jenny Weiss





- ED Attending Attestation


I have performed the following: I have examined & evaluated the patient, The 

case was reviewed & discussed with the resident, I agree w/resident's findings 

& plan, Exceptions are as noted





- HPI


HPI: 





10/13/18 13:26


Mr Redmond is a 62 yo M with a h/o daily and heavy EtOH use (prior withdrawal 

and detox 2 years ago c/b withdrawal seizures) and HTN who p/w tachycardia and 

tremulousness s/p discontinuing ETOH use yesterday


No hallucinations. 


(+) generalized body aches 


(+) bilateral calf pain 


He denies chest pain, headache, vertigo, vision change, abdominal pain, dysuria

, or additional symptoms. 





- Physicial Exam


PE: 





10/13/18 13:29


GENERAL: The patient is in no acute distress.


HEAD: Normal with no signs of trauma.


EYES: PERRLA, EOMI, sclera anicteric, conjunctiva clear.


ENT: Ears normal, nares patent, oropharynx clear without exudates.  Moist 

mucous membranes. Tongue fasciculations


NECK: Normal range of motion, supple 


LUNGS: Breath sounds equal, clear to auscultation bilaterally.  


HEART: Tachycardiac, no murmur 


ABDOMEN: Soft, nontender 


EXTREMITIES: Normal range of motion, no edema.  (+) tremulous 


NEUROLOGICAL: Cranial nerves II through XII grossly intact.  Normal speech.  No 

focal neurological deficits. 


MUSCULOSKELETAL:  no deformities


SKIN: Warm, Dry, normal turgor, no rashes or lesions noted.





- Critical Care Time


Total Critical Care Time: 60


Critical Care Statement: The care of this patient involved high complexity 

decision making to prevent further life threatening deterioration of the patient

's condition and/or to evaluate & treat vital organ system(s) failure or risk 

of failure.





- Medical Decision Making





10/13/18 13:31


Alcohol withdrawal





Will do


labs


RKG


IVF


IV Ativan and Librium 50mg po





10/13/18 16:16








 Laboratory Tests











  10/13/18





  13:10


 


WBC  7.3


 


Hgb  12.3


 


Hct  37.2


 


Plt Count  438 H











 Laboratory Tests











  10/13/18





  15:30


 


Creatine Kinase  113


 


Troponin I  < 0.02


 


B-Natriuretic Peptide  403.8 H


 


Lipase  217











10/13/18 16:25


Chemistries again hemolyzed


 states that the labs are moderately hemolyzed (K is 4.0, AST is 

elevated)


Phlebotomy called to do labs


HR goes as high as 200


Given Banana bag


Give supplemental Mg





10/13/18 16:28





Labs resulted


Pt still tachycardiac and tremulous


More ativan given 


Pt remains on cardiac monitor





Clinical Impression: alcohol withdrawal, initial presentation


10/16/18 20:28








**Discharge Disposition





- Diagnosis


Alcohol withdrawal


Qualifiers:


 Complication of substance-induced condition: uncomplicated Qualified Code(s): 

F10.230 - Alcohol dependence with withdrawal, uncomplicated








- Discharge Dispostion


Condition at time of disposition: Improved


Decision to Admit order: Yes





- Referrals





- Patient Instructions





- Post Discharge Activity

## 2018-10-13 NOTE — HP
CIWA Score





- CIWA Score


Nausea/Vomitin-No Nausea/No Vomiting


Muscle Tremors: 4-Moderate,w/Arms Extend


Anxiety: 0-No Anxiety, at Ease


Agitation: 0-Normal Activity


Paroxysmal Sweats: No Perspiration


Orientation: 0-Oriented


Tacttile Disturbances: 0-None


Auditory Disturbances: 0-None


Visual Disturbances: 0-None


Headache: 0-None Present


CIWA-Ar Total Score: 4





Admission ROS BHS





- HPI


Allergies/Adverse Reactions: 


 Allergies











Allergy/AdvReac Type Severity Reaction Status Date / Time


 


No Known Allergies Allergy   Verified 10/13/18 10:00











History of Present Illness: 





pt here requesting detox from alcohol use , reports 1 .5 bottles vodka/day , 

reports uses x 2  years , denies  seizures, + blackouts , + tremors, + falls 

most recently 1  mo ago w/ scalp laceration went to [facility]  . 

latest use last night , went to [facility] after blackout taken by 

EMS . 


george 0.60 


utox bzo 


pmhx : HTN , has not taken meds > 1 mo ago 


pshx :veronica tkr ( left , r  ) , left  elbow  frx 2/2 fall ORIF  C-spine 

cage and hardware 2 years ago 


psych : denies  


tobacco : denies 


Exam Limitations: No Limitations





- Ebola screening


Have you been sick,other than usual withdrawal symptoms: No





- Review of Systems


Constitutional: No Symptoms Reported


EENT: reports: Other (reading glasses)


Respiratory: reports: No Symptoms reported


Cardiac: reports: No Symptoms Reported


GI: reports: No Symptoms Reported


: reports: No Symptoms Reported


Musculoskeletal: reports: No Symptoms Reported


Integumentary: reports: Dryness, Erythema, Other (swelling in feet 2 weeks ago)


Neuro: reports: Tremors, Unsteady Gait


Endocrine: reports: No Symptoms Reported


Psychiatric: reports: No Sypmtoms Reported, Judgement Intact, Orientated x3


Other Systems: Reviewed and Negative





Patient History





- Patient Medical History


Hx Asthma: No


Hx Chronic Obstructive Pulmonary Disease (COPD): No


Hx Cardiac Disorders: No


Hx Hypertension: Yes


Hx Seizures: No


Hx Diabetes: No


Hx Gastrointestinal Disorders: No


Hx Genitourinary Disorders: No


Hx Sexually Transmitted Disorders: No


Hx Renal Disease (ESRD): No


Hx Depression: No


Hx Suicide Attempt: No


Hx Schizophrenia: No





- Patient Surgical History


Past Surgical History: Yes


Hx Neurologic Surgery: No


Hx Cataract Extraction: No


Hx Cardiac Surgery: No


Hx Lung Surgery: No


Hx Breast Surgery: No


Hx Breast Biopsy: No


Hx Abdominal Surgery: No


Hx Appendectomy: No


Hx Cholecystectomy: No


Hx Genitourinary Surgery: No


Hx  Section: No


Hx Orthopedic Surgery: Yes (B/L REPLACEMENT 2017)


Anesthesia Reaction: No





- PPD History


Previous Implant?: Yes


Documented Results: Negative w/o proof


Implanted On Prior R Admission?: No





- Smoking Cessation


Smoking history: Never smoked


Have you smoked in the past 12 months: No


Hx Chewing Tobacco Use: No





- Substances Abused


  ** Alcohol


Route: Oral


Frequency: Daily


Amount used:  TO 2 VODKA


Age of first use: 25


Date of Last Use: 10/12/18





Admission Physical Exam BHS





- Vital Signs


Vital Signs: 


 Vital Signs - 24 hr











  10/13/18





  09:57


 


Temperature 97.5 F L


 


Pulse Rate 126 H


 


Respiratory 18





Rate 


 


Blood Pressure 156/118 H














- Physical


General Appearance: Yes: Disheveled, Mild Distress, Other (poor personal hygiene

)


HEENTM: Yes: Normocephalic, Normal Voice


Respiratory: Yes: Chest Non-Tender, Lungs Clear, Normal Breath Sounds


Neck: Yes: No masses,lesions,Nodules, Other (surgical scar  right anterolateral

  neck)


Breast: Yes: Breast Exam Deferred


Cardiology: Yes: Tachycardia, Irregular, Other (referred to ER for evaluation 

as  staff unable to obtain EKG 2/2 tremors)


Abdominal: Yes: Normal Bowel Sounds, Non Tender


Genitourinary: Yes: Within Normal Limits


Back: Yes: Normal Inspection


Musculoskeletal: Yes: Joint Stiffness, Joint swelling (right elbow deformity , 

decreased ROM  , cannot fully extend elbow  , veronica hands  deformities, reports 

old frx   while working as a   , bilateral  2nd finger PIP deformity , 

enlarged, radial deviation bilateral  hands  , left VTh dip F contracture , veronica 

knees TKR scars), Other (veronica knees tkr)


Extremities: Yes: Other (veronica le  hyperpigmentation , dry skin , mild pretibial 

edema)


Neurological: Yes: Fully Oriented, Alert, Motor Strength 5/5, Normal Mood/Affect


Integumentary: Yes: Erythema, Other (pre-tibial hyperpigmentation, stasis 

dermatitis , dry skin , pretibial small areas  of ulceration  no d/c  veronica feet  

sking dry , scaly , onychomycosis x 10 toes)





- Diagnostic


(1) Alcohol withdrawal


Current Visit: Yes   Status: Acute   


Qualifiers: 


   Complication of substance-induced condition: with unspecified complication   

Qualified Code(s): F10.239 - Alcohol dependence with withdrawal, unspecified   





BHS Breath Alcohol Content


Breath Alcohol Content: 0.060





Urine Drug Screen





- Results


Drug Screen Negative: No


Urine Drug Screen Results: BZO-Benzodiazepines

## 2018-10-14 LAB
ALBUMIN SERPL-MCNC: 2.9 G/DL (ref 3.4–5)
ALP SERPL-CCNC: 151 U/L (ref 45–117)
ALT SERPL-CCNC: 44 U/L (ref 13–61)
AMPHET UR-MCNC: NEGATIVE NG/ML
ANION GAP SERPL CALC-SCNC: 5 MMOL/L (ref 8–16)
AST SERPL-CCNC: 45 U/L (ref 15–37)
BARBITURATES UR-MCNC: NEGATIVE NG/ML
BASOPHILS # BLD: 0.3 % (ref 0–2)
BENZODIAZ UR SCN-MCNC: POSITIVE NG/ML
BILIRUB SERPL-MCNC: 0.9 MG/DL (ref 0.2–1)
BUN SERPL-MCNC: 9 MG/DL (ref 7–18)
CALCIUM SERPL-MCNC: 8.6 MG/DL (ref 8.5–10.1)
CHLORIDE SERPL-SCNC: 100 MMOL/L (ref 98–107)
CHOLEST SERPL-MCNC: 250 MG/DL (ref 50–200)
CO2 SERPL-SCNC: 29 MMOL/L (ref 21–32)
COCAINE UR-MCNC: NEGATIVE NG/ML
CREAT SERPL-MCNC: 1 MG/DL (ref 0.55–1.3)
DEPRECATED RDW RBC AUTO: 17.8 % (ref 11.9–15.9)
EOSINOPHIL # BLD: 6 % (ref 0–4.5)
GLUCOSE SERPL-MCNC: 116 MG/DL (ref 74–106)
HCT VFR BLD CALC: 37.1 % (ref 35.4–49)
HDLC SERPL-MCNC: 92 MG/DL (ref 40–60)
HGB BLD-MCNC: 12.2 GM/DL (ref 11.7–16.9)
INR BLD: 1.02 (ref 0.83–1.09)
LYMPHOCYTES # BLD: 22 % (ref 8–40)
MAGNESIUM SERPL-MCNC: 1.9 MG/DL (ref 1.8–2.4)
MCH RBC QN AUTO: 30.2 PG (ref 25.7–33.7)
MCHC RBC AUTO-ENTMCNC: 32.8 G/DL (ref 32–35.9)
MCV RBC: 92.2 FL (ref 80–96)
METHADONE UR-MCNC: NEGATIVE NG/ML
MONOCYTES # BLD AUTO: 8.9 % (ref 3.8–10.2)
NEUTROPHILS # BLD: 62.8 % (ref 42.8–82.8)
OPIATES UR QL SCN: NEGATIVE NG/ML
PCP UR QL SCN: NEGATIVE NG/ML
PHOSPHATE SERPL-MCNC: 2.4 MG/DL (ref 2.5–4.9)
PLATELET # BLD AUTO: 417 K/MM3 (ref 134–434)
PMV BLD: 7.5 FL (ref 7.5–11.1)
POTASSIUM SERPLBLD-SCNC: 4.1 MMOL/L (ref 3.5–5.1)
PROT SERPL-MCNC: 7.3 G/DL (ref 6.4–8.2)
PT PNL PPP: 12 SEC (ref 9.7–13)
RBC # BLD AUTO: 4.03 M/MM3 (ref 4–5.6)
SODIUM SERPL-SCNC: 135 MMOL/L (ref 136–145)
TRIGL SERPL-MCNC: 89 MG/DL (ref 0–150)
WBC # BLD AUTO: 5 K/MM3 (ref 4–10)

## 2018-10-14 RX ADMIN — THIAMINE HYDROCHLORIDE SCH MG: 100 INJECTION, SOLUTION INTRAMUSCULAR; INTRAVENOUS at 11:39

## 2018-10-14 RX ADMIN — LORAZEPAM PRN MG: 2 INJECTION INTRAMUSCULAR; INTRAVENOUS at 22:30

## 2018-10-14 RX ADMIN — METOPROLOL TARTRATE SCH: 25 TABLET, FILM COATED ORAL at 22:00

## 2018-10-14 RX ADMIN — LORAZEPAM PRN MG: 2 INJECTION INTRAMUSCULAR; INTRAVENOUS at 16:47

## 2018-10-14 RX ADMIN — MAGNESIUM OXIDE TAB 400 MG (241.3 MG ELEMENTAL MG) SCH MG: 400 (241.3 MG) TAB at 11:39

## 2018-10-14 RX ADMIN — POTASSIUM & SODIUM PHOSPHATES POWDER PACK 280-160-250 MG SCH: 280-160-250 PACK at 22:30

## 2018-10-14 RX ADMIN — HEPARIN SODIUM SCH UNIT: 5000 INJECTION, SOLUTION INTRAVENOUS; SUBCUTANEOUS at 22:30

## 2018-10-14 RX ADMIN — POTASSIUM & SODIUM PHOSPHATES POWDER PACK 280-160-250 MG SCH PACKET: 280-160-250 PACK at 15:25

## 2018-10-14 RX ADMIN — METOPROLOL TARTRATE SCH MG: 25 TABLET, FILM COATED ORAL at 11:39

## 2018-10-14 NOTE — PN
Physical Exam: 


SUBJECTIVE: Patient seen and examined, calm, appropriate, denies any anxiety, 

tremors, nausea, vomiting, abdominal pain or new concerns. 








OBJECTIVE:





 Vital Signs











 Period  Temp  Pulse  Resp  BP Sys/Cheung  Pulse Ox


 


 Last 24 Hr  98.9 F    16-18  107-159/  











GENERAL: sitting in bed in no acute distress, awake and appropriate


CVS:S1S2 regular, tachycardic


Chest; No rales or wheezing, decreased effort


Abdomen:soft, obese, NT, no voluntary or involuntary guarding or rigidity, 

positive bowel sounds


Extremities: no edema, minimal tremors, chronic arthritic changes











 Laboratory Results - last 24 hr











  10/13/18 10/13/18 10/13/18





  13:10 13:10 13:17


 


WBC  7.3  


 


RBC  4.09  


 


Hgb  12.3  


 


Hct  37.2  


 


MCV  91.0  


 


MCH  30.1  


 


MCHC  33.1  


 


RDW  17.7 H  


 


Plt Count  438 H  


 


MPV  7.7  


 


Absolute Neuts (auto)  5.7  


 


Neutrophils %  78.4  


 


Lymphocytes %  11.8  


 


Monocytes %  6.1  


 


Eosinophils %  2.1  


 


Basophils %  1.6  


 


Nucleated RBC %  0  


 


PT with INR   12.00 


 


INR   1.02 


 


Sodium    Cancelled


 


Potassium    Cancelled


 


Chloride    Cancelled


 


Carbon Dioxide    Cancelled


 


Anion Gap    Cancelled


 


BUN    Cancelled


 


Creatinine    Cancelled


 


Creat Clearance w eGFR    Cancelled


 


Random Glucose    Cancelled


 


Calcium    Cancelled


 


Phosphorus    Cancelled


 


Magnesium    Cancelled


 


Total Bilirubin    Cancelled


 


AST    Cancelled


 


ALT    Cancelled


 


Alkaline Phosphatase    Cancelled


 


Creatine Kinase    Cancelled


 


Troponin I    Cancelled


 


B-Natriuretic Peptide    Cancelled


 


Total Protein    Cancelled


 


Albumin    Cancelled


 


Total Amylase   


 


Lipase    Cancelled


 


Urine Color   


 


Urine Appearance   


 


Urine pH   


 


Ur Specific Gravity   


 


Urine Protein   


 


Urine Glucose (UA)   


 


Urine Ketones   


 


Urine Blood   


 


Urine Nitrite   


 


Urine Bilirubin   


 


Urine Urobilinogen   


 


Ur Leukocyte Esterase   


 


Urine WBC (Auto)   


 


Urine RBC (Auto)   


 


Ur Epithelial Cells   


 


Urine Mucus   














  10/13/18 10/13/18 10/13/18





  15:30 15:30 15:30


 


WBC   


 


RBC   


 


Hgb   


 


Hct   


 


MCV   


 


MCH   


 


MCHC   


 


RDW   


 


Plt Count   


 


MPV   


 


Absolute Neuts (auto)   


 


Neutrophils %   


 


Lymphocytes %   


 


Monocytes %   


 


Eosinophils %   


 


Basophils %   


 


Nucleated RBC %   


 


PT with INR   


 


INR   


 


Sodium   Cancelled 


 


Potassium   Cancelled 


 


Chloride   Cancelled 


 


Carbon Dioxide   Cancelled 


 


Anion Gap   Cancelled 


 


BUN   Cancelled 


 


Creatinine   Cancelled 


 


Creat Clearance w eGFR   Cancelled 


 


Random Glucose   Cancelled 


 


Calcium   Cancelled 


 


Phosphorus   Cancelled 


 


Magnesium   Cancelled 


 


Total Bilirubin   Cancelled 


 


AST   Cancelled 


 


ALT   Cancelled 


 


Alkaline Phosphatase   Cancelled 


 


Creatine Kinase    Cancelled


 


Troponin I    Cancelled


 


B-Natriuretic Peptide    Cancelled


 


Total Protein   Cancelled 


 


Albumin   Cancelled 


 


Total Amylase   


 


Lipase    Cancelled


 


Urine Color  Straw  


 


Urine Appearance  Clear  


 


Urine pH  7.0  


 


Ur Specific Gravity  1.005 L  


 


Urine Protein  Negative  


 


Urine Glucose (UA)  Negative  


 


Urine Ketones  Negative  


 


Urine Blood  1+ H  


 


Urine Nitrite  Negative  


 


Urine Bilirubin  Negative  


 


Urine Urobilinogen  Negative  


 


Ur Leukocyte Esterase  Negative  


 


Urine WBC (Auto)  None  


 


Urine RBC (Auto)  1  


 


Ur Epithelial Cells  Rare  


 


Urine Mucus  Rare  














  10/13/18 10/13/18





  16:34 16:34


 


WBC  


 


RBC  


 


Hgb  


 


Hct  


 


MCV  


 


MCH  


 


MCHC  


 


RDW  


 


Plt Count  


 


MPV  


 


Absolute Neuts (auto)  


 


Neutrophils %  


 


Lymphocytes %  


 


Monocytes %  


 


Eosinophils %  


 


Basophils %  


 


Nucleated RBC %  


 


PT with INR  


 


INR  


 


Sodium  139 


 


Potassium  3.5 


 


Chloride  104 


 


Carbon Dioxide  26 


 


Anion Gap  9 


 


BUN  12 


 


Creatinine  1.0 


 


Creat Clearance w eGFR  > 60 


 


Random Glucose  96 


 


Calcium  7.5 L 


 


Phosphorus  


 


Magnesium  1.6 L 


 


Total Bilirubin  0.6 


 


AST  70 H 


 


ALT  45 


 


Alkaline Phosphatase  138 H 


 


Creatine Kinase   97


 


Troponin I   < 0.02


 


B-Natriuretic Peptide  


 


Total Protein  6.7 


 


Albumin  2.7 L 


 


Total Amylase  67 


 


Lipase  192 


 


Urine Color  


 


Urine Appearance  


 


Urine pH  


 


Ur Specific Gravity  


 


Urine Protein  


 


Urine Glucose (UA)  


 


Urine Ketones  


 


Urine Blood  


 


Urine Nitrite  


 


Urine Bilirubin  


 


Urine Urobilinogen  


 


Ur Leukocyte Esterase  


 


Urine WBC (Auto)  


 


Urine RBC (Auto)  


 


Ur Epithelial Cells  


 


Urine Mucus  








Active Medications











Generic Name Dose Route Start Last Admin





  Trade Name Freq  PRN Reason Stop Dose Admin


 


Chlordiazepoxide HCl  50 mg  10/14/18 11:00  





  Librium -  PO  10/15/18 05:01  





  [id number] DARIAN   





     





     





     





     


 


Chlordiazepoxide HCl  25 mg  10/15/18 11:00  





  Librium -  PO  10/16/18 05:01  





  [id number] DARIAN   





     





     





     





     


 


Chlordiazepoxide HCl  15 mg  10/16/18 11:00  





  Librium -  PO  10/17/18 05:01  





  [id number] DARIAN   





     





     





     





     


 


Chlordiazepoxide HCl  25 mg  10/14/18 09:34  





  Librium -  PO  10/17/18 09:33  





  Q4H PRN   





  WITHDRAWAL(CONT SUBST)   





     





     





     


 


Chlordiazepoxide HCl  10 mg  10/17/18 11:00  





  Librium -  PO  10/18/18 05:01  





  [id number] DARIAN   





     





     





     





     


 


Dextrose/Sodium Chloride  20 meq in 1,000 mls @ 100 mls/hr  10/13/18 19:30  10/

13/18 20:20





  Dextrose 5%-Normal Saline+20 Meq Kcl -  IV   100 mls/hr





  ASDIR DARIAN   Administration





     





     





     





     


 


Lorazepam  1 mg  10/14/18 09:35  





  Ativan Injection -  IVPUSH   





  Q6H PRN   





  WITHDRAWAL(CONT SUBST)   





     





     





     


 


Magnesium Oxide  800 mg  10/14/18 10:00  





  Mag-Ox -  PO   





  DAILY DARIAN   





     





     





     





     


 


Thiamine HCl  100 mg  10/14/18 10:00  





  Vitamin B1 Injection -  IVPB  10/16/18 10:01  





  DAILY DARIAN   





     





     





     





     











ASSESSMENT/PLAN:


63 yomw ith heavy etoh use, HTN, admitted with active alcohol withdrawal, 

transaminitis, hypomagnesemia





-Active alcohol withdrawal


-Transaminitis, suspect alcohol related


-Hypomagnesemia


-Tachycardia, ?SVT (Few P waves on rhythm strip)


-LE chronic edema with ulceration/scabs





Plan:


Clinically improved. 


taper ativan.


Start librium detox protocol.


Follow up labs today. 


Seizure/fall precautions. 


HR/BP improved with ativan. 


Drug screen at [facility] pos for benzos, otherwise neg.


Will place on metoprolol 25 mg BID. 


Has underlying HTn, non compliant with meds.  


Trend LFTs.


Replete lytes prn


Change folate/thiamine to PO, gentle hydration for now. 


Alcohol cessation counseling.


Dispo back to [facility] when improved.


Plan discussed with patient and nursing in detail, all questions answered





Visit type





- Emergency Visit


Emergency Visit: Yes


ED Registration Date: 10/13/18


Care time: The patient presented to the Emergency Department on the above date 

and was hospitalized for further evaluation of their emergent condition.





- New Patient


This patient is new to me today: No





- Critical Care


Critical Care patient: No





- Discharge Referral


Referred to [facility] No

## 2018-10-14 NOTE — EKG
Test Reason : 

Blood Pressure : ***/*** mmHG

Vent. Rate : 132 BPM     Atrial Rate : 132 BPM

   P-R Int : 146 ms          QRS Dur : 082 ms

    QT Int : 330 ms       P-R-T Axes : 034 004 056 degrees

   QTc Int : 488 ms

 

*** POOR DATA QUALITY, INTERPRETATION MAY BE ADVERSELY AFFECTED

SINUS TACHYCARDIA

NONSPECIFIC ST AND T WAVE ABNORMALITY

ABNORMAL ECG

NO PREVIOUS ECGS AVAILABLE

Confirmed by MARY DARDEN MD ([id number]) on 10/14/2018 10:49:26 AM

 

Referred By: CLAUDE ROTH           Confirmed By:MARY DARDEN MD

## 2018-10-15 LAB
ALBUMIN SERPL-MCNC: 2.5 G/DL (ref 3.4–5)
ALP SERPL-CCNC: 125 U/L (ref 45–117)
ALT SERPL-CCNC: 32 U/L (ref 13–61)
ANION GAP SERPL CALC-SCNC: 5 MMOL/L (ref 8–16)
AST SERPL-CCNC: 25 U/L (ref 15–37)
BASOPHILS # BLD: 1.4 % (ref 0–2)
BILIRUB SERPL-MCNC: 0.7 MG/DL (ref 0.2–1)
BUN SERPL-MCNC: 11 MG/DL (ref 7–18)
CALCIUM SERPL-MCNC: 8.3 MG/DL (ref 8.5–10.1)
CHLORIDE SERPL-SCNC: 103 MMOL/L (ref 98–107)
CO2 SERPL-SCNC: 28 MMOL/L (ref 21–32)
CREAT SERPL-MCNC: 1.2 MG/DL (ref 0.55–1.3)
DEPRECATED RDW RBC AUTO: 18 % (ref 11.9–15.9)
EOSINOPHIL # BLD: 7.2 % (ref 0–4.5)
GLUCOSE SERPL-MCNC: 222 MG/DL (ref 74–106)
HCT VFR BLD CALC: 31.9 % (ref 35.4–49)
HGB BLD-MCNC: 10.4 GM/DL (ref 11.7–16.9)
INR BLD: 1.04 (ref 0.83–1.09)
LYMPHOCYTES # BLD: 14.9 % (ref 8–40)
MAGNESIUM SERPL-MCNC: 1.8 MG/DL (ref 1.8–2.4)
MCH RBC QN AUTO: 29.5 PG (ref 25.7–33.7)
MCHC RBC AUTO-ENTMCNC: 32.6 G/DL (ref 32–35.9)
MCV RBC: 90.7 FL (ref 80–96)
MONOCYTES # BLD AUTO: 7.5 % (ref 3.8–10.2)
NEUTROPHILS # BLD: 69 % (ref 42.8–82.8)
PHOSPHATE SERPL-MCNC: 2.8 MG/DL (ref 2.5–4.9)
PLATELET # BLD AUTO: 366 K/MM3 (ref 134–434)
PMV BLD: 7.6 FL (ref 7.5–11.1)
POTASSIUM SERPLBLD-SCNC: 4.2 MMOL/L (ref 3.5–5.1)
PROT SERPL-MCNC: 6.3 G/DL (ref 6.4–8.2)
PT PNL PPP: 12.3 SEC (ref 9.7–13)
RBC # BLD AUTO: 3.52 M/MM3 (ref 4–5.6)
SODIUM SERPL-SCNC: 136 MMOL/L (ref 136–145)
WBC # BLD AUTO: 6.9 K/MM3 (ref 4–10)

## 2018-10-15 RX ADMIN — SODIUM CHLORIDE SCH MLS/HR: 9 INJECTION, SOLUTION INTRAVENOUS at 10:08

## 2018-10-15 RX ADMIN — THIAMINE HYDROCHLORIDE SCH MG: 100 INJECTION, SOLUTION INTRAMUSCULAR; INTRAVENOUS at 12:35

## 2018-10-15 RX ADMIN — HEPARIN SODIUM SCH UNIT: 5000 INJECTION, SOLUTION INTRAVENOUS; SUBCUTANEOUS at 15:00

## 2018-10-15 RX ADMIN — ALLOPURINOL SCH MG: 100 TABLET ORAL at 12:35

## 2018-10-15 RX ADMIN — MAGNESIUM OXIDE TAB 400 MG (241.3 MG ELEMENTAL MG) SCH MG: 400 (241.3 MG) TAB at 12:19

## 2018-10-15 RX ADMIN — METOPROLOL TARTRATE SCH MG: 25 TABLET, FILM COATED ORAL at 21:05

## 2018-10-15 RX ADMIN — METOPROLOL TARTRATE SCH MG: 25 TABLET, FILM COATED ORAL at 12:19

## 2018-10-15 RX ADMIN — SILVER SULFADIAZINE SCH APPLIC: 10 CREAM TOPICAL at 23:12

## 2018-10-15 RX ADMIN — PANTOPRAZOLE SODIUM SCH MG: 40 TABLET, DELAYED RELEASE ORAL at 12:35

## 2018-10-15 RX ADMIN — HEPARIN SODIUM SCH UNIT: 5000 INJECTION, SOLUTION INTRAVENOUS; SUBCUTANEOUS at 21:04

## 2018-10-15 NOTE — EKG
Test Reason : 

Blood Pressure : ***/*** mmHG

Vent. Rate : 127 BPM     Atrial Rate : 127 BPM

   P-R Int : 152 ms          QRS Dur : 074 ms

    QT Int : 376 ms       P-R-T Axes : 025 -21 033 degrees

   QTc Int : 546 ms

 

SINUS TACHYCARDIA

BASELINE ARTIFACT

WHEN COMPARED WITH ECG OF 13-OCT-2018 13:19,

LIKELY NO SIGNIFICANT CHANGES

Confirmed by KATY YANEZ MD ([id number]) on 10/15/2018 10:38:36 AM

 

Referred By:             Confirmed By:KATY YANEZ MD

## 2018-10-15 NOTE — EKG
Test Reason : 

Blood Pressure : ***/*** mmHG

Vent. Rate : 124 BPM     Atrial Rate : 124 BPM

   P-R Int : 146 ms          QRS Dur : 074 ms

    QT Int : 344 ms       P-R-T Axes : 012 -01 029 degrees

   QTc Int : 494 ms

 

SINUS TACHYCARDIA WITH PREMATURE SUPRAVENTRICULAR COMPLEXES

OTHERWISE NORMAL ECG

WHEN COMPARED WITH ECG OF 13-OCT-2018 14:46,

PREMATURE SUPRAVENTRICULAR COMPLEXES ARE NOW PRESENT



Confirmed by KATY YANEZ MD ([id number]) on 10/15/2018 2:26:23 PM

 

Referred By: ER ER           Confirmed By:KATY YANEZ MD

## 2018-10-15 NOTE — EKG
Test Reason : 

Blood Pressure : ***/*** mmHG

Vent. Rate : 113 BPM     Atrial Rate : 113 BPM

   P-R Int : 126 ms          QRS Dur : 092 ms

    QT Int : 372 ms       P-R-T Axes : 022 006 066 degrees

   QTc Int : 510 ms

 

SINUS TACHYCARDIA WITH PREMATURE SUPRAVENTRICULAR COMPLEXES

BASELINE ARTIFACT

ABNORMAL ECG

NO PREVIOUS ECGS AVAILABLE

Confirmed by RENATA BARRAZA, KATY ([id number]) on 10/15/2018 10:40:04 AM

 

Referred By:             Confirmed By:KATY YANEZ MD

## 2018-10-15 NOTE — PN
Physical Exam: 


SUBJECTIVE: Patient seen and examined at bedside this morning. No acute events 

overnight. Patient was tachycardic this morning, and still reports some shaking 

and heart racing. Patient also complains of right 2nd finger swelling and 

redness. Otherwise, denies nausea, vomiting, chest pain, SOB, abdominal pain, 

diarrhea, urinary symptoms. 








OBJECTIVE:





 Vital Signs











 Period  Temp  Pulse  Resp  BP Sys/Cheung  Pulse Ox


 


 Last 24 Hr  98.2 F-98.7 F    15-20  103-126/59-90  











GENERAL: Awake, alert, and fully oriented, in no acute distress.


HEAD: patchy hypopigmented areas


EYES: PERRLA, EOMI, sclera anicteric, conjunctiva clear. No lid lag.


EARS, NOSE, THROAT: Ears normal, nares patent, oropharynx clear without 

exudates. Dry mucous membranes.


NECK: Normal range of motion, supple without lymphadenopathy, JVD, or masses.


LUNGS: Breath sounds equal, clear to auscultation bilaterally. 


HEART: Tachycardic, normal S1 and S2 without murmur, rub or gallop.


ABDOMEN: Soft, nontender, not distended, normoactive bowel sounds.


MUSCULOSKELETAL: Normal range of motion at all joints. No bony deformities or 

tenderness. No CVA tenderness.


UPPER EXTREMITIES: 2+ pulses, warm, well-perfused. No cyanosis. No clubbing. No 

peripheral edema.


LOWER EXTREMITIES: 2+ pulses, warm, well-perfused. No calf tenderness. +1 b/l 

pitting edema.  +dry, flaky skin on both legs, with wounds and scabs 


NEUROLOGICAL:  Cranial nerves II-XII intact. Normal speech. Gait not observed. 

Sensation intact, motor 5/5


PSYCHIATRIC: Cooperative. Good eye contact. Appropriate mood and affect.


SKIN: Warm, dry, normal turgor, no rashes or lesions noted, normal capillary 

refill. 





 Laboratory Results - last 24 hr











  10/15/18 10/15/18 10/15/18





  05:30 05:30 05:30


 


WBC  6.9  


 


RBC  3.52 L  


 


Hgb  10.4 L  


 


Hct  31.9 L  


 


MCV  90.7  


 


MCH  29.5  


 


MCHC  32.6  


 


RDW  18.0 H  


 


Plt Count  366  


 


MPV  7.6  


 


Absolute Neuts (auto)  4.8  


 


Neutrophils %  69.0  


 


Lymphocytes %  14.9  D  


 


Monocytes %  7.5  


 


Eosinophils %  7.2 H  


 


Basophils %  1.4  D  


 


Nucleated RBC %  0  


 


PT with INR   12.30 


 


INR   1.04 


 


Sodium    136


 


Potassium    4.2


 


Chloride    103


 


Carbon Dioxide    28


 


Anion Gap    5 L


 


BUN    11


 


Creatinine    1.2


 


Creat Clearance w eGFR    > 60


 


Random Glucose    222 H


 


Hemoglobin A1c %   


 


Calcium    8.3 L


 


Phosphorus    2.8


 


Magnesium    1.8


 


Total Bilirubin    0.7


 


AST    25


 


ALT    32


 


Alkaline Phosphatase    125 H


 


Total Protein    6.3 L


 


Albumin    2.5 L














  10/15/18





  05:30


 


WBC 


 


RBC 


 


Hgb 


 


Hct 


 


MCV 


 


MCH 


 


MCHC 


 


RDW 


 


Plt Count 


 


MPV 


 


Absolute Neuts (auto) 


 


Neutrophils % 


 


Lymphocytes % 


 


Monocytes % 


 


Eosinophils % 


 


Basophils % 


 


Nucleated RBC % 


 


PT with INR 


 


INR 


 


Sodium 


 


Potassium 


 


Chloride 


 


Carbon Dioxide 


 


Anion Gap 


 


BUN 


 


Creatinine 


 


Creat Clearance w eGFR 


 


Random Glucose 


 


Hemoglobin A1c %  6.3


 


Calcium 


 


Phosphorus 


 


Magnesium 


 


Total Bilirubin 


 


AST 


 


ALT 


 


Alkaline Phosphatase 


 


Total Protein 


 


Albumin 








Active Medications











Generic Name Dose Route Start Last Admin





  Trade Name Freq  PRN Reason Stop Dose Admin


 


Allopurinol  100 mg  10/15/18 10:00  10/15/18 12:35





  Zyloprim -  PO   100 mg





  DAILY DARIAN   Administration





     





     





     





     


 


Chlordiazepoxide HCl  25 mg  10/15/18 11:00  10/15/18 12:18





  Librium -  PO  10/16/18 05:01  25 mg





  [id number] DARIAN   Administration





     





     





     





     


 


Chlordiazepoxide HCl  15 mg  10/16/18 11:00  





  Librium -  PO  10/17/18 05:01  





  [id number] DARIAN   





     





     





     





     


 


Chlordiazepoxide HCl  25 mg  10/14/18 09:34  





  Librium -  PO  10/17/18 09:33  





  Q4H PRN   





  WITHDRAWAL(CONT SUBST)   





     





     





     


 


Chlordiazepoxide HCl  10 mg  10/17/18 11:00  





  Librium -  PO  10/18/18 05:01  





  [id number] DARIAN   





     





     





     





     


 


Heparin Sodium (Porcine)  5,000 unit  10/14/18 22:00  10/14/18 22:30





  Heparin -  SQ   5,000 unit





  TID DARIAN   Administration





     





     





     





     


 


Sodium Chloride  1,000 mls @ 75 mls/hr  10/15/18 09:15  10/15/18 10:08





  Normal Saline -  IV   75 mls/hr





  ASDIR DARIAN   Administration





     





     





     





     


 


Lorazepam  1 mg  10/15/18 09:38  





  Ativan Injection -  IVPUSH   





  Q3H PRN   





  WITHDRAWAL(CONT SUBST)   





     





     





     


 


Magnesium Oxide  800 mg  10/14/18 10:00  10/15/18 12:19





  Mag-Ox -  PO   800 mg





  DAILY DARIAN   Administration





     





     





     





     


 


Metoprolol Tartrate  25 mg  10/14/18 10:00  10/15/18 12:19





  Lopressor -  PO   25 mg





  BID DARIAN   Administration





     





     





     





     


 


Pantoprazole Sodium  40 mg  10/15/18 10:00  10/15/18 12:35





  Protonix -  PO   40 mg





  DAILY DARIAN   Administration





     





     





     





     


 


Thiamine HCl  100 mg  10/14/18 10:00  10/15/18 12:35





  Vitamin B1 Injection -  IVPB  10/16/18 10:01  100 mg





  DAILY DARIAN   Administration





     





     





     





     








ASSESSMENT/PLAN:


Patient is a 63 year old male with past medical history of EtOH abuse (

recurrent admissions at [facility] for withdrawal and detox, hx of withdrawal 

seizures), HTN and gout, was brought in from [facility] due to shaking, 

generalized body aches and weakness, and heart racing for a few days. 





#Alcohol withdrawal


-Previous history of withdrawal seizures


-Patient remained tachycardic today.


-Close monitoring and seizure precautions


-On Librium detox protocol


-Ativan 1mg IV q3h PRN


-Replete Mg, Folic acid


-Thiamine 1000mg IVPB [id number] (2/3)


-Detox (Dr. Navarrete) consulted. Recommendations appreciated.





#Hypomagnesemia


-Mg 1.6


-replete as necessary


-Magnesium 1gm x3 given overnight


-MgOx 800mg PO daily


-will monitor





#Hypertension


-Pt not compliant, don't know what medications his taking


-Started Metoprolol 25mg BID


-monitor BP





#Hypergylcemia


-Glu - 222


-A1c 6.3


-D5 discontinued. Start IV NS @75


-will continue to monitor





#Hyperlipidemia





#Gout


-started on Allopurinol 100mg daily





#FEN


-IV NS @75ml/hr


-hypoMg,hypophos, will replete


-Routine bmp monitoring


-Sodium controlled diet with aspiration precautions





#Prophylaxis


-Heparin 5000units sq tid





#Disposition


-admit to telemetry


-Discharge back to [facility] when medically optimized.





Visit type





- Emergency Visit


Emergency Visit: Yes


ED Registration Date: 10/13/18


Care time: The patient presented to the Emergency Department on the above date 

and was hospitalized for further evaluation of their emergent condition.





- New Patient


This patient is new to me today: Yes


Date on this admission: 10/16/18





- Critical Care


Critical Care patient: No

## 2018-10-15 NOTE — CONSULT
Consult Detox [facility]


Reason for Current Admission/Consult: heavy alcohol use





- History


History of Present Illness: 





Pt was first seen in [facility] on 10/13 for alcohol use disorder and for detox- 

but pt noted to have high /118 and  and so was transferred to 

[facility] for further management.





Pt admits to drinking 1 1/2 bottles of Vodka/day.  Pt states he does not take 

other substances- only alcohol. Pt states has been drinking for many years. No h

/o seizures or DT's per pt. Has had admissions to [facility] for detox- but 

relapses because he "gets the shakes". Was seen at [facility] ER after a fall, 

and then presented to [facility] for admission for alcohol detox. 








Urine tox positive for benzo and breath alcohol test pos at [facility].





Confidential Drug Utilization Report


Search Terms: [place], 1955 Search Date: 10/15/2018 11:16:45 AM


The Drug Utilization Report below displays all of the controlled substance 

prescriptions, if any, that your patient has filled in the last twelve months. 

The information displayed on this report is compiled from pharmacy submissions 

to the Department, and accurately reflects the information as submitted by the 

pharmacies.





This report was requested by: William Navarrete | Reference #: [id number]





There are no results for the search terms that you entered.





 





CIWA Score





- CIWA Score


Nausea/Vomitin-No Nausea/No Vomiting


Muscle Tremors: None


Anxiety: 0-No Anxiety, at Ease


Agitation: 0-Normal Activity


Paroxysmal Sweats: No Perspiration


Orientation: 0-Oriented


Tacttile Disturbances: 0-None


Auditory Disturbances: 0-None


Visual Disturbances: 0-None


Headache: 0-None Present


CIWA-Ar Total Score: 0





Assessment Plan





- Diagnosis


(1) Alcohol use disorder


Status: Acute   





- Plan


Plan: 





Pt can go back to [facility] to complete detox when medically cleared. Pt is 

from [place], so would like to go to long term drug rehab center closer to home





- Medication


Detox Regimen/Protocol: Librium

## 2018-10-15 NOTE — CONSULT
- Consultation


REQUESTING PROVIDER: 





CONSULT REQUEST: We have been asked to surgically evaluate this patient for 

bilateral leg ulcers





PCP:Rafael Lloyd MD





HISTORY OF PRESENT ILLNESS:


62yo M h/o ETOH abuse who was admitted for alcohol withdrawal.  Vascular team 

was contacted to evaluate his chronic b/l leg wounds.  Pt states that he has a 

history of leg wounds but does not follow up with anyone for wound care.  Pt 

denies history of vascular surgery.  Denies fever, chills, n/v. 





PMHx:   HTN, Gout, ETOH abuse       





    


 Home Medications











 Medication  Instructions  Recorded


 


Allopurinol [Zyloprim -] 100 mg PO DAILY  tablet 10/15/18


 


Magnesium Oxide [Mag-Ox -] 800 mg PO DAILY  tablet 10/15/18


 


Metoprolol Tartrate [Lopressor -] 25 mg PO BID  tablet 10/15/18


 


Naph,Mb-Db/K pH,Mbdb [PHOS-NaK 2 packet PO BID  pow 10/15/18





PACKET -]  


 


Pantoprazole Sodium [Protonix -] 40 mg PO DAILY  tablet.ec 10/15/18








 Allergies











Allergy/AdvReac Type Severity Reaction Status Date / Time


 


No Known Allergies Allergy   Verified 10/13/18 10:00














PHYSICAL EXAM:


GENERAL: Awake, alert, and fully oriented, in no acute distress.


HEAD: Normal with no signs of trauma.


EYES: PERRL, sclera anicteric, conjunctiva clear.


NECK: Normal ROM, supple without lymphadenopathy, JVD, or masses.


LUNGS: breathing comfortably, No accessory muscle use.


HEART: Regular rate and rhythm. No murmurs


MUSCULOSKELETAL: Normal ROM at all joints. No bony deformities or tenderness. 

No CVA tenderness.


LOWER EXTREMITIES: 2+ pulses, warm, well-perfused. RLE shows 2 x 3 cm 

ulceration on anterior shin, b/l legs show hyperpigmentation, cracking, and 

skin changes


NEUROLOGICAL: Normal speech, gait not observed.


PSYCH: Cooperative. Good eye contact. Appropriate mood and affect.


SKIN: Warm, dry, normal turgor, no rashes or lesions noted.


 Vital Signs











Temperature  98.7 F   10/15/18 05:29


 


Pulse Rate  108 H  10/15/18 12:14


 


Respiratory Rate  17   10/15/18 12:14


 


Blood Pressure  126/90   10/15/18 12:14


 


O2 Sat by Pulse Oximetry (%)  100   10/15/18 05:29








 Lab Results











WBC  6.9 K/mm3 (4.0-10.0)   10/15/18  05:30    


 


RBC  3.52 M/mm3 (4.00-5.60)  L  10/15/18  05:30    


 


Hgb  10.4 GM/dL (11.7-16.9)  L  10/15/18  05:30    


 


Hct  31.9 % (35.4-49)  L  10/15/18  05:30    


 


MCV  90.7 fl (80-96)   10/15/18  05:30    


 


MCHC  32.6 g/dl (32.0-35.9)   10/15/18  05:30    


 


RDW  18.0 % (11.9-15.9)  H  10/15/18  05:30    


 


Plt Count  366 K/MM3 (134-434)   10/15/18  05:30    


 


Sodium  136 mmol/L (136-145)   10/15/18  05:30    


 


Potassium  4.2 mmol/L (3.5-5.1)   10/15/18  05:30    


 


Chloride  103 mmol/L ()   10/15/18  05:30    


 


Carbon Dioxide  28 mmol/L (21-32)   10/15/18  05:30    


 


Anion Gap  5 MMOL/L (8-16)  L  10/15/18  05:30    


 


BUN  11 mg/dL (7-18)   10/15/18  05:30    


 


Creatinine  1.2 mg/dL (0.55-1.3)   10/15/18  05:30    


 


Random Glucose  222 mg/dL ()  H  10/15/18  05:30    


 


Calcium  8.3 mg/dL (8.5-10.1)  L  10/15/18  05:30    


 


INR  1.04  (0.83-1.09)   10/15/18  05:30    














Problem List





- Problems


(1) Leg ulcer


Assessment/Plan: 


Plan


   -recommend silvadene on RLE ulcer and compression dressings


   -follow up with wound care clinic as an outpatient


   -no surgical intervention at this time. 








Code(s): L97.909 - NON-PRS CHRONIC ULC UNSP PRT OF UNSP LOW LEG W UNSP SEVERITY

## 2018-10-15 NOTE — PN
Teaching Attending Note


Name of Resident: Naida Finnegan





ATTENDING PHYSICIAN STATEMENT





I saw and evaluated the patient.


I reviewed the resident's note and discussed the case with the resident.


I agree with the resident's findings and plan as documented with exceptions 

below.








SUBJECTIVE:


patient sen and examined. felt palpitations earlier, now resolved, tolerating 

diet well, denies anxiety, dizziness or new concerns.





OBJECTIVE:


 Vital Signs











 Period  Temp  Pulse  Resp  BP Sys/Cheung  Pulse Ox


 


 Last 24 Hr  98.2 F-98.7 F    15-20  103-150/59-90  








 Intake & Output











 10/12/18 10/13/18 10/14/18 10/15/18





 23:59 23:59 23:59 23:59


 


Weight  163 lb  








general; sitting in bed in no acute distress


CVS: S1S2 regular, tachycardic


Chest: CTAB, no rales or wheezing


Abdomen: soft, obese, NT


extremities: superficial ulcer on lower shin at site of prior scab, unchanged 

chronic findings





Active Medications





Chlordiazepoxide HCl (Librium -)  25 mg PO [id number] [facility]


   Stop: 10/16/18 05:01


Chlordiazepoxide HCl (Librium -)  15 mg PO V1C-BGM DARIAN


   Stop: 10/17/18 05:01


Chlordiazepoxide HCl (Librium -)  25 mg PO Q4H PRN


   PRN Reason: WITHDRAWAL(CONT SUBST)


   Stop: 10/17/18 09:33


Chlordiazepoxide HCl (Librium -)  10 mg PO [id number] DARIAN


   Stop: 10/18/18 05:01


Heparin Sodium (Porcine) (Heparin -)  5,000 unit SQ TID [facility]


   Last Admin: 10/14/18 22:30 Dose:  5,000 unit


Sodium Chloride (Normal Saline -)  1,000 mls @ 75 mls/hr IV ASDIR DARIAN


Lorazepam (Ativan Injection -)  1 mg IVPUSH Q6H PRN


   PRN Reason: WITHDRAWAL(CONT SUBST)


   Last Admin: 10/14/18 22:30 Dose:  1 mg


Magnesium Oxide (Mag-Ox -)  800 mg PO DAILY [facility]


   Last Admin: 10/14/18 11:39 Dose:  800 mg


Metoprolol Tartrate (Lopressor -)  25 mg PO BID [facility]


   Last Admin: 10/14/18 22:00 Dose:  Not Given


Thiamine HCl (Vitamin B1 Injection -)  100 mg IVPB DAILY DARIAN


   Stop: 10/16/18 10:01


   Last Admin: 10/14/18 11:39 Dose:  100 mg





 Laboratory Results - last 24 hr











  10/14/18 10/14/18 10/14/18





  10:25 10:25 10:25


 


WBC  5.0  


 


RBC  4.03  


 


Hgb  12.2  


 


Hct  37.1  


 


MCV  92.2  


 


MCH  30.2  


 


MCHC  32.8  


 


RDW  17.8 H  


 


Plt Count  417  


 


MPV  7.5  


 


Absolute Neuts (auto)  3.1  


 


Neutrophils %  62.8  


 


Lymphocytes %  22.0  D  


 


Monocytes %  8.9  


 


Eosinophils %  6.0 H D  


 


Basophils %  0.3  


 


Nucleated RBC %  0  


 


PT with INR   


 


INR   


 


Sodium   135 L 


 


Potassium   4.1 


 


Chloride   100 


 


Carbon Dioxide   29 


 


Anion Gap   5 L 


 


BUN   9 


 


Creatinine   1.0 


 


Creat Clearance w eGFR   > 60 


 


Random Glucose   116 H 


 


Calcium   8.6 


 


Phosphorus   2.4 L 


 


Magnesium   1.9 


 


Total Bilirubin   0.9 


 


Direct Bilirubin    0.4 H


 


AST   45 H 


 


ALT   44 


 


Alkaline Phosphatase   151 H 


 


Total Protein   7.3 


 


Albumin   2.9 L 


 


Triglycerides   89 


 


Cholesterol   250 H 


 


Total LDL Cholesterol   138 H 


 


HDL Cholesterol   92 H 


 


Opiates Screen   


 


Methadone Screen   


 


Barbiturate Screen   


 


Phencyclidine Screen   


 


Ur Amphetamines Screen   


 


MDMA (Ecstasy) Screen   


 


Benzodiazepines Screen   


 


Cocaine Screen   


 


U Marijuana (THC) Screen   














  10/14/18 10/14/18 10/15/18





  10:25 11:00 05:30


 


WBC    6.9


 


RBC    3.52 L


 


Hgb    10.4 L


 


Hct    31.9 L


 


MCV    90.7


 


MCH    29.5


 


MCHC    32.6


 


RDW    18.0 H


 


Plt Count    366


 


MPV    7.6


 


Absolute Neuts (auto)    4.8


 


Neutrophils %    69.0


 


Lymphocytes %    14.9  D


 


Monocytes %    7.5


 


Eosinophils %    7.2 H


 


Basophils %    1.4  D


 


Nucleated RBC %    0


 


PT with INR  12.00  


 


INR  1.02  


 


Sodium   


 


Potassium   


 


Chloride   


 


Carbon Dioxide   


 


Anion Gap   


 


BUN   


 


Creatinine   


 


Creat Clearance w eGFR   


 


Random Glucose   


 


Calcium   


 


Phosphorus   


 


Magnesium   


 


Total Bilirubin   


 


Direct Bilirubin   


 


AST   


 


ALT   


 


Alkaline Phosphatase   


 


Total Protein   


 


Albumin   


 


Triglycerides   


 


Cholesterol   


 


Total LDL Cholesterol   


 


HDL Cholesterol   


 


Opiates Screen   Negative 


 


Methadone Screen   Negative 


 


Barbiturate Screen   Negative 


 


Phencyclidine Screen   Negative 


 


Ur Amphetamines Screen   Negative 


 


MDMA (Ecstasy) Screen   Negative 


 


Benzodiazepines Screen   Positive A* 


 


Cocaine Screen   Negative 


 


U Marijuana (THC) Screen   Negative 














  10/15/18 10/15/18





  05:30 05:30


 


WBC  


 


RBC  


 


Hgb  


 


Hct  


 


MCV  


 


MCH  


 


MCHC  


 


RDW  


 


Plt Count  


 


MPV  


 


Absolute Neuts (auto)  


 


Neutrophils %  


 


Lymphocytes %  


 


Monocytes %  


 


Eosinophils %  


 


Basophils %  


 


Nucleated RBC %  


 


PT with INR  12.30 


 


INR  1.04 


 


Sodium   136


 


Potassium   4.2


 


Chloride   103


 


Carbon Dioxide   28


 


Anion Gap   5 L


 


BUN   11


 


Creatinine   1.2


 


Creat Clearance w eGFR   > 60


 


Random Glucose   222 H


 


Calcium   8.3 L


 


Phosphorus   2.8


 


Magnesium   1.8


 


Total Bilirubin   0.7


 


Direct Bilirubin  


 


AST   25


 


ALT   32


 


Alkaline Phosphatase   125 H


 


Total Protein   6.3 L


 


Albumin   2.5 L


 


Triglycerides  


 


Cholesterol  


 


Total LDL Cholesterol  


 


HDL Cholesterol  


 


Opiates Screen  


 


Methadone Screen  


 


Barbiturate Screen  


 


Phencyclidine Screen  


 


Ur Amphetamines Screen  


 


MDMA (Ecstasy) Screen  


 


Benzodiazepines Screen  


 


Cocaine Screen  


 


U Marijuana (THC) Screen  








 Microbiology





10/13/18 15:30   Urine - Urine Clean Catch   Urine Culture - Final


                            Contaminated: Please Repeat





Telemetry: SInus tach upto 138





ASSESSMENT AND PLAN:


63 yomw ith heavy etoh use, HTN, admitted with active alcohol withdrawal, 

transaminitis, hypomagnesemia





-Active alcohol withdrawal


-Transaminitis, suspect alcohol related


-Hypomagnesemia


-Hyperglycemia


-Tachycardia, ?SVT (Few P waves on rhythm strip)


-LE chronic edema with ulceration/scabs





Plan:


Clinically improved. 


continue librium detox protocol.


Seizure/fall precautions. 


Ongoing tachycardic. AtivanIV prn. 


Detox consult. 


Continue metoprolol 25 mg BID. 


Has underlying HTn, non compliant with meds.  


Trend LFTs.


Replete lytes prn


Hyperglycemia , d/c D5. A1c 6.3


Continue folate/thiamine. 


Alcohol cessation counseling.


Dispo back to [facility] when improved,. in 24-48 hours


Plan discussed with patient and nursing in detail, all questions answered

## 2018-10-16 LAB
ALBUMIN SERPL-MCNC: 2.3 G/DL (ref 3.4–5)
ALP SERPL-CCNC: 103 U/L (ref 45–117)
ALT SERPL-CCNC: 26 U/L (ref 13–61)
ANION GAP SERPL CALC-SCNC: 6 MMOL/L (ref 8–16)
AST SERPL-CCNC: 21 U/L (ref 15–37)
BASOPHILS # BLD: 1.3 % (ref 0–2)
BILIRUB SERPL-MCNC: 0.5 MG/DL (ref 0.2–1)
BUN SERPL-MCNC: 14 MG/DL (ref 7–18)
CALCIUM SERPL-MCNC: 8.1 MG/DL (ref 8.5–10.1)
CHLORIDE SERPL-SCNC: 105 MMOL/L (ref 98–107)
CO2 SERPL-SCNC: 27 MMOL/L (ref 21–32)
CREAT SERPL-MCNC: 1.1 MG/DL (ref 0.55–1.3)
DEPRECATED RDW RBC AUTO: 17.3 % (ref 11.9–15.9)
EOSINOPHIL # BLD: 7.1 % (ref 0–4.5)
GLUCOSE SERPL-MCNC: 113 MG/DL (ref 74–106)
HCT VFR BLD CALC: 30.2 % (ref 35.4–49)
HGB BLD-MCNC: 9.8 GM/DL (ref 11.7–16.9)
LYMPHOCYTES # BLD: 22.6 % (ref 8–40)
MAGNESIUM SERPL-MCNC: 1.8 MG/DL (ref 1.8–2.4)
MCH RBC QN AUTO: 29.7 PG (ref 25.7–33.7)
MCHC RBC AUTO-ENTMCNC: 32.6 G/DL (ref 32–35.9)
MCV RBC: 91.1 FL (ref 80–96)
MONOCYTES # BLD AUTO: 6.3 % (ref 3.8–10.2)
NEUTROPHILS # BLD: 62.7 % (ref 42.8–82.8)
PHOSPHATE SERPL-MCNC: 3.3 MG/DL (ref 2.5–4.9)
PLATELET # BLD AUTO: 379 K/MM3 (ref 134–434)
PMV BLD: 7.8 FL (ref 7.5–11.1)
POTASSIUM SERPLBLD-SCNC: 4.4 MMOL/L (ref 3.5–5.1)
PROT SERPL-MCNC: 6 G/DL (ref 6.4–8.2)
RBC # BLD AUTO: 3.31 M/MM3 (ref 4–5.6)
SODIUM SERPL-SCNC: 138 MMOL/L (ref 136–145)
WBC # BLD AUTO: 7.8 K/MM3 (ref 4–10)

## 2018-10-16 RX ADMIN — SILVER SULFADIAZINE SCH APPLIC: 10 CREAM TOPICAL at 22:13

## 2018-10-16 RX ADMIN — METOPROLOL TARTRATE SCH MG: 25 TABLET, FILM COATED ORAL at 09:10

## 2018-10-16 RX ADMIN — ALLOPURINOL SCH MG: 100 TABLET ORAL at 09:10

## 2018-10-16 RX ADMIN — SODIUM CHLORIDE SCH MLS/HR: 9 INJECTION, SOLUTION INTRAVENOUS at 09:11

## 2018-10-16 RX ADMIN — AMLODIPINE BESYLATE SCH MG: 2.5 TABLET ORAL at 17:46

## 2018-10-16 RX ADMIN — SILVER SULFADIAZINE SCH APPLIC: 10 CREAM TOPICAL at 09:22

## 2018-10-16 RX ADMIN — THIAMINE HYDROCHLORIDE SCH MG: 100 INJECTION, SOLUTION INTRAMUSCULAR; INTRAVENOUS at 09:10

## 2018-10-16 RX ADMIN — MAGNESIUM OXIDE TAB 400 MG (241.3 MG ELEMENTAL MG) SCH MG: 400 (241.3 MG) TAB at 09:09

## 2018-10-16 RX ADMIN — HEPARIN SODIUM SCH UNIT: 5000 INJECTION, SOLUTION INTRAVENOUS; SUBCUTANEOUS at 22:12

## 2018-10-16 RX ADMIN — PANTOPRAZOLE SODIUM SCH MG: 40 TABLET, DELAYED RELEASE ORAL at 09:10

## 2018-10-16 RX ADMIN — METOPROLOL TARTRATE SCH MG: 25 TABLET, FILM COATED ORAL at 22:12

## 2018-10-16 RX ADMIN — HEPARIN SODIUM SCH UNIT: 5000 INJECTION, SOLUTION INTRAVENOUS; SUBCUTANEOUS at 13:50

## 2018-10-16 NOTE — ECHO
______________________________________________________________________________



Name: EDILBERTO SHAW                               Exam:Adult Echocardiogram

MRN: [id number]         Study Date: 10/16/2018 01:11 PM

Age: 63 yrs

______________________________________________________________________________



Reason For Study: TACHYCARDIA

Height: 66 in        Weight: 164 lb        BSA: 1.8 m2



______________________________________________________________________________



MMode/2D Measurements & Calculations

IVSd: 0.90 cm                                         Ao root diam: 2.6 cm

LVIDd: 4.2 cm                                         LA dimension: 3.3 cm

LVIDs: 2.9 cm

LVPWd: 0.83 cm



_______________________________________________________

EDV(Teich): 79.2 ml

ESV(Teich): 31.9 ml



Doppler Measurements & Calculations

MV E max krishan: 51.8 cm/sec                                 Ao V2 max: 102.6 cm/sec

MV A max krishan: 88.4 cm/sec                                 Ao max P.2 mmHg

MV E/A: 0.59



___________________________________________________________

LV V1 max P.2 mmHg                                    MR max krishan: 149.7 cm/sec

LV V1 max: 74.7 cm/sec                                    MR max P.0 mmHg



___________________________________________________________

Med Peak E' Krishan: 6.0 cm/sec

Med E/e': 8.6

Lat Peak E' Krishan: 6.0 cm/sec

Lat E/e': 8.6





______________________________________________________________________________

Procedure

The study was technically difficult with many images being suboptimal in quality.

Left Ventricle

The left ventricular size, thickness and function are normal. LVEF = 60-65%. E/A reversal consistent 
with but

not diagnostic of poor LV compliance.

Right Ventricle

The right ventricle is normal in size and function.

Atria

Normal left and right atrial size and function.

Mitral Valve

There is mild mitral annular calcification.

Tricuspid Valve

The tricuspid valve is normal in structure and function. There was insufficient TR detected to calcul
ate RV

systolic pressure.

Aortic Valve

There is mild aortic sclerosis.;.

Pulmonic Valve

The pulmonic valve is not well visualized.

Great Vessels

The aortic root is normal size. Normal aortic arch, descending and ascending aorta.

Pericardium/Pleura

There is no pericardial effusion.



______________________________________________________________________________



Interpretation Summary

The study was technically difficult with many images being suboptimal in quality.

The left ventricular size, thickness and function are normal. LVEF = 60-65%.

The right ventricle is normal in size and function.

Normal left and right atrial size and function.

There is mild aortic sclerosis.;

There is mild mitral annular calcification.





MD Piper German 10/16/2018 04:47 PM

## 2018-10-16 NOTE — PN
Teaching Attending Note


Name of Resident: Naida Finnegan





ATTENDING PHYSICIAN STATEMENT





I saw and evaluated the patient.


I reviewed the resident's note and discussed the case with the resident.


I agree with the resident's findings and plan as documented.








SUBJECTIVE:asymptomatic. deneis CP, SOB, fever, chills, N/V/C/D, auditory/

visual hallucinations, agitation, RM


denies ever smoking.


no heart disease in the family or sudden unexplained deaths (mother  of MI 

in 80's. brother in 70's)


had echo in the past unsure why it was done or results. never had cardiac workup








OBJECTIVE:


 Last Vital Signs











Temp Pulse Resp BP Pulse Ox


 


 98.7 F   88   18   133/78   97 


 


 10/16/18 13:00  10/16/18 13:00  10/16/18 13:00  10/16/18 13:00  10/16/18 09:26








General NAD


CV S1 S2 RRR no murmur/rub/gallop


Lungs CTA B/L no wheezing/rales/rhonchi


extremities no tremors





ASSESSMENT AND PLAN:


62yo M with PMH continuous ETOH dependence and HTN presented to the ER in 

active ETOH withdrawals and found to be tachycardic


1. Tachycardia- appears to be sinus on EKG. concern for SVT on presentation. 

nothing seen here. on monitor noted to have some PVC. cardiac enzymes neg x1. 

will check echo


2. Continuous ETOH dependence- CIWA 0. on librium protocol. on day 3 of 4. 

interested in inpatient rehab when completed. will call over to [facility] for 

pt to return to complete detox. has not done inpatient rehab in the past. 

counselled on cessation of ETOH use


3. Normocytic anemia- slowly trending down. never had workup in the past. 

denies BRBPR or melena. possible dilutional component. will repeat. would 

benefit from colonoscopy as outpatinet


4. Hypomagnesemia- resolved


5. Transaminitis- due to ETOH. resolved


6. HTN- controlled. cont home management


7. DVT ppx- hep sq


8/ pending echo result. can d/c back to [facility] to complete detox and start 

inpatient rehab

## 2018-10-16 NOTE — PN
Physical Exam: 


SUBJECTIVE: Patient seen and examined at bedside this morning. No acute events 

overnight. Patient remained hemodynamically stable. HR between . 








OBJECTIVE:





 Vital Signs











 Period  Temp  Pulse  Resp  BP Sys/Cheung  Pulse Ox


 


 Last 24 Hr  97.6 F-98.7 F    18-20  105-143/71-99  








GENERAL: Awake, alert, and fully oriented, in no acute distress.


HEAD: patchy hypopigmented areas


EYES: PERRLA, EOMI, sclera anicteric, conjunctiva clear. No lid lag.


EARS, NOSE, THROAT: Ears normal, nares patent, oropharynx clear without 

exudates. Dry mucous membranes.


NECK: Normal range of motion, supple without lymphadenopathy, JVD, or masses.


LUNGS: Breath sounds equal, clear to auscultation bilaterally. 


HEART: regular rate and rhythm, normal S1 and S2 without murmur, rub or gallop.


ABDOMEN: Soft, nontender, not distended, normoactive bowel sounds.


MUSCULOSKELETAL: Normal range of motion at all joints. No bony deformities or 

tenderness. No CVA tenderness.


UPPER EXTREMITIES: 2+ pulses, warm, well-perfused. No cyanosis. No clubbing. No 

peripheral edema.


LOWER EXTREMITIES: 2+ pulses, warm, well-perfused. No calf tenderness. +1 b/l 

pitting edema.  +dry, flaky skin on both legs, with wounds and scabs 


NEUROLOGICAL:  Cranial nerves II-XII intact. Normal speech. Gait not observed. 

Sensation intact, motor 5/5


PSYCHIATRIC: Cooperative. Good eye contact. Appropriate mood and affect.


SKIN: Warm, dry, normal turgor, no rashes or lesions noted, normal capillary 

refill. 





 Laboratory Results - last 24 hr











  10/16/18 10/16/18





  06:00 06:00


 


WBC  7.8 


 


RBC  3.31 L 


 


Hgb  9.8 L 


 


Hct  30.2 L 


 


MCV  91.1 


 


MCH  29.7 


 


MCHC  32.6 


 


RDW  17.3 H 


 


Plt Count  379 


 


MPV  7.8 


 


Absolute Neuts (auto)  4.9 


 


Neutrophils %  62.7 


 


Lymphocytes %  22.6  D 


 


Monocytes %  6.3 


 


Eosinophils %  7.1 H 


 


Basophils %  1.3 


 


Nucleated RBC %  0 


 


Sodium   138


 


Potassium   4.4


 


Chloride   105


 


Carbon Dioxide   27


 


Anion Gap   6 L


 


BUN   14


 


Creatinine   1.1


 


Creat Clearance w eGFR   > 60


 


Random Glucose   113 H


 


Calcium   8.1 L


 


Phosphorus   3.3


 


Magnesium   1.8


 


Total Bilirubin   0.5


 


AST   21


 


ALT   26


 


Alkaline Phosphatase   103


 


Total Protein   6.0 L


 


Albumin   2.3 L








Active Medications











Generic Name Dose Route Start Last Admin





  Trade Name Freq  PRN Reason Stop Dose Admin


 


Allopurinol  100 mg  10/15/18 10:00  10/16/18 09:10





  Zyloprim -  PO   100 mg





  DAILY DARIAN   Administration





     





     





     





     


 


Chlordiazepoxide HCl  15 mg  10/16/18 11:00  10/16/18 11:44





  Librium -  PO  10/17/18 05:01  15 mg





  [id number] DARIAN   Administration





     





     





     





     


 


Chlordiazepoxide HCl  25 mg  10/14/18 09:34  





  Librium -  PO  10/17/18 09:33  





  Q4H PRN   





  WITHDRAWAL(CONT SUBST)   





     





     





     


 


Chlordiazepoxide HCl  10 mg  10/17/18 11:00  





  Librium -  PO  10/18/18 05:01  





  [id number] DARIAN   





     





     





     





     


 


Heparin Sodium (Porcine)  5,000 unit  10/14/18 22:00  10/16/18 13:50





  Heparin -  SQ   5,000 unit





  TID DARIAN   Administration





     





     





     





     


 


Lorazepam  1 mg  10/15/18 09:38  





  Ativan Injection -  IVPUSH   





  Q3H PRN   





  WITHDRAWAL(CONT SUBST)   





     





     





     


 


Magnesium Oxide  800 mg  10/14/18 10:00  10/16/18 09:09





  Mag-Ox -  PO   800 mg





  DAILY DARIAN   Administration





     





     





     





     


 


Metoprolol Tartrate  25 mg  10/14/18 10:00  10/16/18 09:10





  Lopressor -  PO   25 mg





  BID DARIAN   Administration





     





     





     





     


 


Pantoprazole Sodium  40 mg  10/15/18 10:00  10/16/18 09:10





  Protonix -  PO   40 mg





  DAILY DARIAN   Administration





     





     





     





     


 


Silver Sulfadiazine  1 applic  10/15/18 22:00  10/16/18 09:22





  Silvadene -  TP   1 applic





  BID DARIAN   Administration





     





     





     





     











ASSESSMENT/PLAN:


Patient is a 63 year old male with past medical history of EtOH abuse (

recurrent admissions at [facility] for withdrawal and detox, hx of withdrawal 

seizures), HTN and gout, was brought in from [facility] due to shaking, 

generalized body aches and weakness, and heart racing for a few days. 





#Alcohol withdrawal


-Previous history of withdrawal seizures


-Patient remained more stable overnight with HR 


-Close monitoring and seizure precautions


-On Librium detox protocol


-Ativan 1mg IV q3h PRN


-Replete Mg, Folic acid


-Thiamine 1000mg IVPB [id number] (3/3)


-Detox (Dr. Navarrete) consulted. Recommendations appreciated.





#Hypomagnesemia


-Mg 1.8


-replete as necessary


-MgOx 800mg PO daily


-will monitor





#Hypertension


-Pt not compliant, don't know what medications his taking


-Started Metoprolol 25mg BID


-monitor BP





#Hypergylcemia


-Glu - 379


-A1c 6.3


-will continue to monitor





#Hyperlipidemia





#Gout


-started on Allopurinol 100mg daily





#FEN


-Not on any standing fluids


-hypoMg,hypophos, will replete


-Routine bmp monitoring


-Sodium controlled diet with aspiration precautions





#Prophylaxis


-Heparin 5000units sq tid





#Disposition


-admit to telemetry


-Discharge back to [facility] when medically optimized.








Visit type





- Emergency Visit


Emergency Visit: Yes


ED Registration Date: 10/13/18


Care time: The patient presented to the Emergency Department on the above date 

and was hospitalized for further evaluation of their emergent condition.





- New Patient


This patient is new to me today: Yes


Date on this admission: 10/16/18





- Critical Care


Critical Care patient: No

## 2018-10-17 LAB
DEPRECATED RDW RBC AUTO: 17.5 % (ref 11.9–15.9)
HCT VFR BLD CALC: 30.4 % (ref 35.4–49)
HGB BLD-MCNC: 9.6 GM/DL (ref 11.7–16.9)
MCH RBC QN AUTO: 28.9 PG (ref 25.7–33.7)
MCHC RBC AUTO-ENTMCNC: 31.7 G/DL (ref 32–35.9)
MCV RBC: 91.2 FL (ref 80–96)
PLATELET # BLD AUTO: 382 K/MM3 (ref 134–434)
PMV BLD: 7.6 FL (ref 7.5–11.1)
RBC # BLD AUTO: 3.34 M/MM3 (ref 4–5.6)
WBC # BLD AUTO: 8.8 K/MM3 (ref 4–10)

## 2018-10-17 RX ADMIN — SILVER SULFADIAZINE SCH APPLIC: 10 CREAM TOPICAL at 10:11

## 2018-10-17 RX ADMIN — HEPARIN SODIUM SCH UNIT: 5000 INJECTION, SOLUTION INTRAVENOUS; SUBCUTANEOUS at 21:22

## 2018-10-17 RX ADMIN — SILVER SULFADIAZINE SCH APPLIC: 10 CREAM TOPICAL at 21:22

## 2018-10-17 RX ADMIN — HEPARIN SODIUM SCH: 5000 INJECTION, SOLUTION INTRAVENOUS; SUBCUTANEOUS at 19:54

## 2018-10-17 RX ADMIN — HEPARIN SODIUM SCH: 5000 INJECTION, SOLUTION INTRAVENOUS; SUBCUTANEOUS at 19:52

## 2018-10-17 RX ADMIN — TAMSULOSIN HYDROCHLORIDE SCH MG: 0.4 CAPSULE ORAL at 10:02

## 2018-10-17 RX ADMIN — HEPARIN SODIUM SCH UNIT: 5000 INJECTION, SOLUTION INTRAVENOUS; SUBCUTANEOUS at 06:01

## 2018-10-17 RX ADMIN — MAGNESIUM OXIDE TAB 400 MG (241.3 MG ELEMENTAL MG) SCH MG: 400 (241.3 MG) TAB at 10:02

## 2018-10-17 RX ADMIN — NIFEDIPINE SCH MG: 30 TABLET, EXTENDED RELEASE ORAL at 10:02

## 2018-10-17 RX ADMIN — AMLODIPINE BESYLATE SCH MG: 2.5 TABLET ORAL at 10:02

## 2018-10-17 RX ADMIN — FOLIC ACID SCH MG: 1 TABLET ORAL at 10:02

## 2018-10-17 RX ADMIN — METOPROLOL TARTRATE SCH MG: 25 TABLET, FILM COATED ORAL at 21:22

## 2018-10-17 RX ADMIN — METOPROLOL TARTRATE SCH MG: 25 TABLET, FILM COATED ORAL at 10:02

## 2018-10-17 RX ADMIN — ALLOPURINOL SCH MG: 100 TABLET ORAL at 10:02

## 2018-10-17 RX ADMIN — Medication SCH MG: at 10:02

## 2018-10-17 RX ADMIN — PANTOPRAZOLE SODIUM SCH MG: 40 TABLET, DELAYED RELEASE ORAL at 10:02

## 2018-10-17 NOTE — PN
Physical Exam: 


SUBJECTIVE: Patient seen and examined at bedside this morning. No acute events 

overnight. Patient has no new complaints. 








OBJECTIVE:





 Vital Signs











 Period  Temp  Pulse  Resp  BP Sys/Cheung  Pulse Ox


 


 Last 24 Hr  97.9 F-98.8 F    16-20  107-164/  97-97











GENERAL: Awake, alert, and fully oriented, in no acute distress.


HEAD: patchy hypopigmented areas


EYES: PERRLA, EOMI, sclera anicteric, conjunctiva clear. No lid lag.


EARS, NOSE, THROAT: Ears normal, nares patent, oropharynx clear without 

exudates. Dry mucous membranes.


NECK: Normal range of motion, supple without lymphadenopathy, JVD, or masses.


LUNGS: Breath sounds equal, clear to auscultation bilaterally. 


HEART: regular rate and rhythm, normal S1 and S2 without murmur, rub or gallop.


ABDOMEN: Soft, nontender, not distended, normoactive bowel sounds.


MUSCULOSKELETAL: Normal range of motion at all joints. No bony deformities or 

tenderness. No CVA tenderness.


UPPER EXTREMITIES: 2+ pulses, warm, well-perfused. No cyanosis. No clubbing. No 

peripheral edema.


LOWER EXTREMITIES: 2+ pulses, warm, well-perfused. No calf tenderness. +1 b/l 

pitting edema.  +dry, flaky skin on both legs, with wounds and scabs 


NEUROLOGICAL:  Cranial nerves II-XII intact. Normal speech. Gait not observed. 

Sensation intact, motor 5/5


PSYCHIATRIC: Cooperative. Good eye contact. Appropriate mood and affect.


SKIN: Warm, dry, normal turgor, no rashes or lesions noted, normal capillary 

refill.





 Laboratory Results - last 24 hr











  10/17/18





  06:00


 


WBC  8.8


 


RBC  3.34 L


 


Hgb  9.6 L


 


Hct  30.4 L


 


MCV  91.2


 


MCH  28.9


 


MCHC  31.7 L


 


RDW  17.5 H


 


Plt Count  382


 


MPV  7.6








Active Medications











Generic Name Dose Route Start Last Admin





  Trade Name Freq  PRN Reason Stop Dose Admin


 


Allopurinol  100 mg  10/15/18 10:00  10/17/18 10:02





  Zyloprim -  PO   100 mg





  DAILY DARIAN   Administration





     





     





     





     


 


Amlodipine Besylate  2.5 mg  10/16/18 17:45  10/17/18 10:02





  Norvasc -  PO   2.5 mg





  DAILY DARIAN   Administration





     





     





     





     


 


Chlordiazepoxide HCl  10 mg  10/17/18 11:00  10/17/18 11:27





  Librium -  PO  10/18/18 05:01  10 mg





  [id number] DARIAN   Administration





     





     





     





     


 


Folic Acid  1 mg  10/17/18 10:00  10/17/18 10:02





  Folic Acid -  PO   1 mg





  DAILY DARIAN   Administration





     





     





     





     


 


Heparin Sodium (Porcine)  5,000 unit  10/14/18 22:00  10/17/18 06:01





  Heparin -  SQ   5,000 unit





  TID DARIAN   Administration





     





     





     





     


 


Lorazepam  1 mg  10/15/18 09:38  





  Ativan Injection -  IVPUSH   





  Q3H PRN   





  WITHDRAWAL(CONT SUBST)   





     





     





     


 


Magnesium Oxide  800 mg  10/14/18 10:00  10/17/18 10:02





  Mag-Ox -  PO   800 mg





  DAILY DARIAN   Administration





     





     





     





     


 


Metoprolol Tartrate  50 mg  10/17/18 07:51  10/17/18 10:02





  Lopressor -  PO   50 mg





  BID DARIAN   Administration





     





     





     





     


 


Nifedipine  30 mg  10/17/18 10:00  10/17/18 10:02





  Procardia Xl -  PO   30 mg





  DAILY DARIAN   Administration





     





     





     





     


 


Pantoprazole Sodium  40 mg  10/15/18 10:00  10/17/18 10:02





  Protonix -  PO   40 mg





  DAILY DARIAN   Administration





     





     





     





     


 


Silver Sulfadiazine  1 applic  10/15/18 22:00  10/17/18 10:11





  Silvadene -  TP   1 applic





  BID DARIAN   Administration





     





     





     





     


 


Tamsulosin HCl  0.4 mg  10/17/18 10:00  10/17/18 10:02





  Flomax -  PO   0.4 mg





  DAILY DARIAN   Administration





     





     





     





     


 


Thiamine HCl  100 mg  10/17/18 10:00  10/17/18 10:02





  Vitamin B1 -  PO   100 mg





  DAILY DARIAN   Administration





     





     





     





     








ASSESSMENT/PLAN:


Patient is a 63 year old male with past medical history of EtOH abuse (

recurrent admissions at [facility] for withdrawal and detox, hx of withdrawal 

seizures), HTN and gout, was brought in from [facility] due to shaking, 

generalized body aches and weakness, and heart racing for a few days. 





#Alcohol withdrawal


-Previous history of withdrawal seizures


-Patient remained more stable overnight with HR 


-Close monitoring and seizure precautions


-On Librium detox protocol


-Ativan 1mg IV q3h PRN


-Replete Mg, Folic acid


-Thiamine 1000mg IVPB [id number] (3/3)


-Detox (Dr. Navarrete) consulted. Recommendations appreciated.





#Hypomagnesemia


-Mg 1.8


-replete as necessary


-MgOx 800mg PO daily


-will monitor





#Hypertension


-Pt not compliant, don't know what medications his taking


-Increase Metoprolol 50mg BID


-monitor BP





#Hypergylcemia


-Glu - 379


-A1c 6.3


-will continue to monitor





#Hyperlipidemia





#Gout


-started on Allopurinol 100mg daily





#FEN


-Not on any standing fluids


-hypoMg,hypophos, will replete


-Routine bmp monitoring


-Sodium controlled diet with aspiration precautions





#Prophylaxis


-Heparin 5000units sq tid





#Disposition


-admit to telemetry


-Discharge back to [facility] or home tomorrow.





Visit type





- Emergency Visit


Emergency Visit: Yes


ED Registration Date: 10/13/18


Care time: The patient presented to the Emergency Department on the above date 

and was hospitalized for further evaluation of their emergent condition.





- New Patient


This patient is new to me today: Yes


Date on this admission: 10/18/18





- Critical Care


Critical Care patient: No

## 2018-10-17 NOTE — PN
Teaching Attending Note


Name of Resident: Naida Finnegan





ATTENDING PHYSICIAN STATEMENT





I saw and evaluated the patient.


I reviewed the resident's note and discussed the case with the resident.


I agree with the resident's findings and plan as documented.








SUBJECTIVE:asymptomatic. denies Cp, SOB, fever,chills, N/V/C/D, auditory/visual 

hallucinations








OBJECTIVE:





 Last Vital Signs











Temp Pulse Resp BP Pulse Ox


 


 98.8 F   89   20   132/83   97 


 


 10/17/18 10:00  10/17/18 10:00  10/17/18 10:00  10/17/18 10:00  10/17/18 10:00











General NAD


extremities no tremors





ASSESSMENT AND PLAN:


62yo M with PMH continuous ETOH dependence and HTN presented to the ER in 

active ETOH withdrawals and found to be tachycardic


1. Tachycardia- appears to be sinus on EKG. remains sinus on monitor. Echo done 

with no WMA. should have stress test done as outpatient. stressed importance of 

following up with cardio as outpatient. 


2. Continuous ETOH dependence- CIWA 0. on librium protocol. on day 4 of 4. 

interested in inpatient rehab when completed. will see if [facility] has beds 

available.  has not done inpatient rehab in the past. counselled on cessation 

of ETOH use


3. Normocytic anemia-stable. should have screening colonoscopy done as 

outpatient


4. Hypomagnesemia- resolved


5. Transaminitis- due to ETOH. resolved


6. HTN- controlled. cont home management


7. DVT ppx- hep sq


8. Completing detox today. will see if detox bed is available. (none available 

yesterday). if not will pursue inpatient rehab tomorrow.

## 2018-10-18 RX ADMIN — Medication SCH MG: at 10:15

## 2018-10-18 RX ADMIN — MAGNESIUM OXIDE TAB 400 MG (241.3 MG ELEMENTAL MG) SCH MG: 400 (241.3 MG) TAB at 10:15

## 2018-10-18 RX ADMIN — TAMSULOSIN HYDROCHLORIDE SCH MG: 0.4 CAPSULE ORAL at 10:15

## 2018-10-18 RX ADMIN — NIFEDIPINE SCH MG: 30 TABLET, EXTENDED RELEASE ORAL at 10:15

## 2018-10-18 RX ADMIN — AMLODIPINE BESYLATE SCH MG: 2.5 TABLET ORAL at 10:16

## 2018-10-18 RX ADMIN — HEPARIN SODIUM SCH UNIT: 5000 INJECTION, SOLUTION INTRAVENOUS; SUBCUTANEOUS at 05:31

## 2018-10-18 RX ADMIN — PANTOPRAZOLE SODIUM SCH MG: 40 TABLET, DELAYED RELEASE ORAL at 10:16

## 2018-10-18 RX ADMIN — METOPROLOL TARTRATE SCH MG: 25 TABLET, FILM COATED ORAL at 10:15

## 2018-10-18 RX ADMIN — ALLOPURINOL SCH MG: 100 TABLET ORAL at 10:15

## 2018-10-18 RX ADMIN — HEPARIN SODIUM SCH UNIT: 5000 INJECTION, SOLUTION INTRAVENOUS; SUBCUTANEOUS at 13:30

## 2018-10-18 RX ADMIN — HEPARIN SODIUM SCH UNIT: 5000 INJECTION, SOLUTION INTRAVENOUS; SUBCUTANEOUS at 21:20

## 2018-10-18 RX ADMIN — SILVER SULFADIAZINE SCH APPLIC: 10 CREAM TOPICAL at 10:16

## 2018-10-18 RX ADMIN — METOPROLOL TARTRATE SCH MG: 50 TABLET, FILM COATED ORAL at 21:20

## 2018-10-18 RX ADMIN — FOLIC ACID SCH MG: 1 TABLET ORAL at 10:15

## 2018-10-18 RX ADMIN — SILVER SULFADIAZINE SCH APPLIC: 10 CREAM TOPICAL at 21:20

## 2018-10-18 NOTE — PN
Teaching Attending Note


Name of Resident: Naida Finnegan





ATTENDING PHYSICIAN STATEMENT





I saw and evaluated the patient.


I reviewed the resident's note and discussed the case with the resident.


I agree with the resident's findings and plan as documented.








SUBJECTIVE:asymptomatic. denies Cp, SOB, fever, chills, N/V/C/D








OBJECTIVE:





 Last Vital Signs











Temp Pulse Resp BP Pulse Ox


 


 98.0 F   97 H  18   104/69   95 


 


 10/18/18 10:00  10/18/18 10:00  10/18/18 10:00  10/18/18 10:00  10/17/18 21:00











General NAD


extremities no tremors





ASSESSMENT AND PLAN:


64yo M with PMH continuous ETOH dependence and HTN presented to the ER in 

active ETOH withdrawals and found to be tachycardic


1. Tachycardia- appears to be sinus on EKG. remains sinus on monitor. Echo done 

with no WMA. should have stress test done as outpatient. stressed importance of 

following up with cardio as outpatient. 


2. Continuous ETOH dependence- CIWA 0. completed librium protocol.  interested 

in inpatient rehab when completed. [facility] does not have beds at this time. 

will give information for patient to call and start once completed with [facility]. 


3. Normocytic anemia-stable. should have screening colonoscopy done as 

outpatient


4. Hypomagnesemia- resolved


5. Transaminitis- due to ETOH. resolved


6. HTN- controlled. cont home management


7. DVT ppx- hep sq


8. Only ambulated with PT 35Ft. would benefit from [facility] placement. awaiting bed 

availability and ins auth. d/c cardiac monitor

## 2018-10-18 NOTE — DS
Physical Exam: 


SUBJECTIVE: Patient seen and examined at bedside this morning. No acute events 

overnight. Patient has no new complaints. 








OBJECTIVE:





 Vital Signs











 Period  Temp  Pulse  Resp  BP Sys/Cheung  Pulse Ox


 


 Last 24 Hr  97.9 F-98.3 F  79-97  18-20  104-128/63-95  95








PHYSICAL EXAM





ENERAL: Awake, alert, and fully oriented, in no acute distress.


HEAD: patchy hypopigmented areas


EYES: PERRLA, EOMI, sclera anicteric, conjunctiva clear. No lid lag.


EARS, NOSE, THROAT: Ears normal, nares patent, oropharynx clear without 

exudates. Dry mucous membranes.


NECK: Normal range of motion, supple without lymphadenopathy, JVD, or masses.


LUNGS: Breath sounds equal, clear to auscultation bilaterally. 


HEART: regular rate and rhythm, normal S1 and S2 without murmur, rub or gallop.


ABDOMEN: Soft, nontender, not distended, normoactive bowel sounds.


MUSCULOSKELETAL: Normal range of motion at all joints. No bony deformities or 

tenderness. No CVA tenderness.


UPPER EXTREMITIES: 2+ pulses, warm, well-perfused. No cyanosis. No clubbing. No 

peripheral edema.


LOWER EXTREMITIES: 2+ pulses, warm, well-perfused. No calf tenderness. +1 b/l 

pitting edema.  +dry, flaky skin on both legs, with wounds and scabs 


NEUROLOGICAL:  Cranial nerves II-XII intact. Normal speech. Gait not observed. 

Sensation intact, motor 5/5


PSYCHIATRIC: Cooperative. Good eye contact. Appropriate mood and affect.


SKIN: Warm, dry, normal turgor, no rashes or lesions noted, normal capillary 

refill.





LABS


 CBC,CMP











WBC  8.8 K/mm3 (4.0-10.0)   10/17/18  06:00    


 


RBC  3.34 M/mm3 (4.00-5.60)  L  10/17/18  06:00    


 


Hgb  9.6 GM/dL (11.7-16.9)  L  10/17/18  06:00    


 


Hct  30.4 % (35.4-49)  L  10/17/18  06:00    


 


MCV  91.2 fl (80-96)   10/17/18  06:00    


 


MCH  28.9 pg (25.7-33.7)   10/17/18  06:00    


 


MCHC  31.7 g/dl (32.0-35.9)  L  10/17/18  06:00    


 


RDW  17.5 % (11.9-15.9)  H  10/17/18  06:00    


 


Plt Count  382 K/MM3 (134-434)   10/17/18  06:00    


 


MPV  7.6 fl (7.5-11.1)   10/17/18  06:00    


 


Absolute Neuts (auto)  4.9 K/mm3 (1.5-8.0)   10/16/18  06:00    


 


Neutrophils %  62.7 % (42.8-82.8)   10/16/18  06:00    


 


Lymphocytes %  22.6 % (8-40)  D 10/16/18  06:00    


 


Monocytes %  6.3 % (3.8-10.2)   10/16/18  06:00    


 


Eosinophils %  7.1 % (0-4.5)  H  10/16/18  06:00    


 


Basophils %  1.3 % (0-2.0)   10/16/18  06:00    


 


Nucleated RBC %  0 % (0-0)   10/16/18  06:00    


 


Sodium  138 mmol/L (136-145)   10/16/18  06:00    


 


Potassium  4.4 mmol/L (3.5-5.1)   10/16/18  06:00    


 


Chloride  105 mmol/L ()   10/16/18  06:00    


 


Carbon Dioxide  27 mmol/L (21-32)   10/16/18  06:00    


 


Anion Gap  6 MMOL/L (8-16)  L  10/16/18  06:00    


 


BUN  14 mg/dL (7-18)   10/16/18  06:00    


 


Creatinine  1.1 mg/dL (0.55-1.3)   10/16/18  06:00    


 


Creat Clearance w eGFR  > 60  (>60)   10/16/18  06:00    


 


Random Glucose  113 mg/dL ()  H  10/16/18  06:00    


 


Hemoglobin A1c %  6.3 % (4.2-6.3)   10/15/18  05:30    


 


Calcium  8.1 mg/dL (8.5-10.1)  L  10/16/18  06:00    


 


Phosphorus  3.3 mg/dL (2.5-4.9)   10/16/18  06:00    


 


Magnesium  1.8 mg/dL (1.8-2.4)   10/16/18  06:00    


 


Total Bilirubin  0.5 mg/dL (0.2-1)   10/16/18  06:00    


 


Direct Bilirubin  0.4 mg/dL (0.0-0.2)  H  10/14/18  10:25    


 


AST  21 U/L (15-37)   10/16/18  06:00    


 


ALT  26 U/L (13-61)   10/16/18  06:00    


 


Alkaline Phosphatase  103 U/L ()   10/16/18  06:00    


 


Creatine Kinase  97 IU/L ()   10/13/18  16:34    


 


Troponin I  < 0.02 ng/ml (0.00-0.05)   10/13/18  16:34    


 


B-Natriuretic Peptide  Cancelled   10/13/18  15:30    


 


Total Protein  6.0 g/dl (6.4-8.2)  L  10/16/18  06:00    


 


Albumin  2.3 g/dl (3.4-5.0)  L  10/16/18  06:00    


 


Triglycerides  89 mg/dL (0-150)   10/14/18  10:25    


 


Cholesterol  250 mg/dL ()  H  10/14/18  10:25    


 


Total LDL Cholesterol  138 mg/dL (5-100)  H  10/14/18  10:25    


 


HDL Cholesterol  92 mg/dL (40-60)  H  10/14/18  10:25    


 


Total Amylase  67 U/L ()   10/13/18  16:34    


 


Lipase  192 U/L ()   10/13/18  16:34    














HOSPITAL COURSE:





Date of Admission:10/13/18





Date of Discharge: 10/18/18





Patient is a 63 year old male with past medical history of EtOH abuse (

recurrent admissions at [facility] for withdrawal and detox, hx of withdrawal 

seizures), HTN and gout, was brought in from [facility] due to shaking, 

generalized body aches and weakness, and heart racing for a few days. Patient 

was admitted for severe alcohol withdrawal. He was placed on Librium detox 

protocol. He was also given Folic acid and thiamine, and electrolytes were 

repleted as needed. Metoprolol was increased to 50mg BID for hypertension. 

Patient was also started on Allopurinol 100mg daily for gout. Patient was noted 

to have elevated cholesterol and was started on Atorvastatin 20mg daily. 

Patient completed alcohol detox protocol and was discharged home. 








Minutes to complete discharge: 35





Discharge Summary


Reason For Visit: TACHYCARDIA/ALCOHOL WITHDRAWAL SYNDROME/PROLONGED


Current Active Problems





Alcohol use disorder (Acute)


Alcohol withdrawal (Acute)


Leg ulcer (Acute)








Condition: Improved





- Instructions


Diet, Activity, Other Instructions: 


You were admitted because you had severe withdrawals from drinking alcohol. You 

were started on Librium detox protocol.


It is very important that you completely stop drinking alcohol. Drinking 

alcohol can lead to worsening symptoms and health problems including liver 

failure and even death. 





You also have been complaining of swelling and pain of your fingers. This is 

probably caused by gout from drinking too much alcohol.


You were started on a new medication, Allopurinol 100mg once a day.


If you have any skin rash, belly pain, jaundice, flu symptoms, unusual bleeding

, weakness or numbness, stop taking the medication and call your primary care 

doctor immediately. 





You were found to have elevated cholesterol on your blood.


You were started on a new medication, Atorvastatin 20mg daily.


You will continue taking this medication at home which you will take once every 

night because this causes drowsiness.


If you notice any new muscle aches or pain, new belly pain, jaundice or new 

concerns on this medication, please stop the medication and call your doctor. 





Please take the following medications as instructed.


   1. Multivitamins 1 tablet once daily.


   2. Thiamine 100mg once tomorrow.


   3. Protonix 40mg once a day.


   4. Allopurinol 100mg once daily.


   5. Atorvastatin 20mg once daily at bedtime. 





Continue your home medications as prescribed.





Please follow-up with your primary care doctor in 1-2 weeks to have your blood 

work checked.


You will need to follow-up:


-CBC


-BMP


-Magnesium level


-Phosphorus level


-Uric acid level





Do not drive or operate heavy machinery till seen by your doctor.


Avoid being alone with children, on heights or in water.


Avoid tylenol, motrin, Ibuprofen/NSAIDs or any other over-the-counter 

medications without discussion with your doctor.





Call 911 or go to the ED if with severe headaches, loss of consciousness, 

weakness or numbness, seizures, belly pain, dark or bloody stools, or with any 

new concerns noted. 











Disposition: HOME





- Home Medications


Comprehensive Discharge Medication List: 


Ambulatory Orders





Allopurinol [Zyloprim -] 100 mg PO DAILY  tablet 10/15/18 


Pantoprazole Sodium [Protonix -] 40 mg PO DAILY  tablet.ec 10/15/18 


Amlodipine Besylate [Norvasc -] 2.5 mg PO DAILY 10/16/18 


Folic Acid 1 mg PO DAILY 10/16/18 


Methocarbamol [Robaxin -] 750 mg PO BID 10/16/18 


Nifedipine ER [Procardia XL -] 30 mg PO DAILY 10/16/18 


Silver Sulfadiazine 1% Top Cr [Silvadene -] 1 applic TP BID  jar 10/16/18 


Tamsulosin HCl [Flomax] 0.4 mg PO DAILY 10/16/18 


Thiamine HCl [B-1] 100 mg PO DAILY 10/16/18 


Metoprolol Tartrate [Lopressor -] 50 mg PO BID #30 tablet 10/18/18 








This patient is new to me today: Yes


Date on this admission: 10/19/18


Emergency Visit: Yes


ED Registration Date: 10/13/18


Care time: The patient presented to the Emergency Department on the above date 

and was hospitalized for further evaluation of their emergent condition.


Critical Care patient: No





- Discharge Referral


Referred to [facility] No

## 2018-10-18 NOTE — PN
[facility] Progress Note


Note: 





Pt seen for transfer to rehab at [facility]. Pt has rheumatoid arthritis which 

limits his hand movements and has difficulty ambulating and with balance. Pt 

states he was out of bed yesterday with a rolling walker and with physical 

therapist assitance . He ambulated once today with the nurse. Pt has not 

ambulated on his own. Pt needs help with dressing and is only able to feed 

himself slowly. 








At [facility] rehab unit, pts are ambulatory and good with ADL's. In addition 

they need to participate in drug rehab activities.





We don't think at his current level of physical functioning that he is a good 

candidate for the [facility] rehab floor.





Please call if with questions: [phone]

## 2018-10-19 VITALS — DIASTOLIC BLOOD PRESSURE: 86 MMHG | SYSTOLIC BLOOD PRESSURE: 118 MMHG | HEART RATE: 84 BPM

## 2018-10-19 VITALS — TEMPERATURE: 98 F

## 2018-10-19 RX ADMIN — SILVER SULFADIAZINE SCH APPLIC: 10 CREAM TOPICAL at 09:17

## 2018-10-19 RX ADMIN — HEPARIN SODIUM SCH UNIT: 5000 INJECTION, SOLUTION INTRAVENOUS; SUBCUTANEOUS at 05:30

## 2018-10-19 RX ADMIN — METOPROLOL TARTRATE SCH MG: 50 TABLET, FILM COATED ORAL at 09:16
